# Patient Record
Sex: MALE | Race: WHITE | NOT HISPANIC OR LATINO | Employment: FULL TIME | URBAN - NONMETROPOLITAN AREA
[De-identification: names, ages, dates, MRNs, and addresses within clinical notes are randomized per-mention and may not be internally consistent; named-entity substitution may affect disease eponyms.]

---

## 2023-10-10 ENCOUNTER — OFFICE VISIT (OUTPATIENT)
Dept: PSYCHIATRY | Facility: CLINIC | Age: 23
End: 2023-10-10
Payer: COMMERCIAL

## 2023-10-10 DIAGNOSIS — Z62.819 HISTORY OF ABUSE IN CHILDHOOD: ICD-10-CM

## 2023-10-10 DIAGNOSIS — F31.81 BIPOLAR II DISORDER: Primary | ICD-10-CM

## 2023-10-10 DIAGNOSIS — G47.9 TROUBLE IN SLEEPING: ICD-10-CM

## 2023-10-10 PROCEDURE — 90837 PSYTX W PT 60 MINUTES: CPT | Performed by: SOCIAL WORKER
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
## 2023-10-10 NOTE — TREATMENT PLAN
Multi-Disciplinary Problems (from Behavioral Health Treatment Plan)      Active Problems       Problem: Mood Instability  Start Date: 10/10/23      Problem Details: The patient self-scales this problem as a 7 with 10 being the worst.          Goal Priority Start Date Expected End Date End Date    Patient will achieve mood stability as evidenced by controlled behavior and a more deliberate thought process -- 10/10/23 -- --    Goal Details: Progress toward goal:  The patient self-scales their progress related to this goal as a 7 with 10 being the worst.          Goal Intervention Frequency Start Date End Date    Provide structure and focus to patient's thoughts and actions by establishing plans and routine. PRN 10/10/23 --    Intervention Details: Duration of treatment until until discharged.          Goal Intervention Frequency Start Date End Date    Assist patient in setting responsible goals and limits in behavior. PRN 10/10/23 --    Intervention Details: Duration of treatment until until discharged.                          Reviewed By       Martina Boogie LCSW 10/10/23 1300                     I have discussed and reviewed this treatment plan with the patient.

## 2023-10-24 ENCOUNTER — OFFICE VISIT (OUTPATIENT)
Dept: PSYCHIATRY | Facility: CLINIC | Age: 23
End: 2023-10-24
Payer: COMMERCIAL

## 2023-10-24 DIAGNOSIS — G47.9 TROUBLE IN SLEEPING: ICD-10-CM

## 2023-10-24 DIAGNOSIS — Z62.819 HISTORY OF ABUSE IN CHILDHOOD: ICD-10-CM

## 2023-10-24 DIAGNOSIS — F31.81 BIPOLAR II DISORDER: Primary | ICD-10-CM

## 2023-10-24 PROCEDURE — 90837 PSYTX W PT 60 MINUTES: CPT | Performed by: SOCIAL WORKER
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
## 2023-10-31 ENCOUNTER — TELEPHONE (OUTPATIENT)
Dept: PSYCHIATRY | Facility: CLINIC | Age: 23
End: 2023-10-31
Payer: COMMERCIAL

## 2023-10-31 NOTE — TELEPHONE ENCOUNTER
Patient called stating that a friend tried to kill themselves last night.  Provided education, therapeutic support. Discussed safety plan, 988 hotline, what to do if he begins to have any thoughts of self harm or harm to others which he adamantly and convincingly denies.  Patient was offered an appointment to day which he declined. Martina DAVIS

## 2023-11-13 ENCOUNTER — OFFICE VISIT (OUTPATIENT)
Dept: PSYCHIATRY | Facility: CLINIC | Age: 23
End: 2023-11-13
Payer: COMMERCIAL

## 2023-11-13 DIAGNOSIS — F31.81 BIPOLAR II DISORDER: Primary | ICD-10-CM

## 2023-11-13 PROCEDURE — 90837 PSYTX W PT 60 MINUTES: CPT | Performed by: SOCIAL WORKER
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
## 2023-12-01 DIAGNOSIS — G47.9 TROUBLE IN SLEEPING: ICD-10-CM

## 2023-12-01 DIAGNOSIS — F31.81 BIPOLAR II DISORDER: ICD-10-CM

## 2023-12-04 RX ORDER — LAMOTRIGINE 200 MG/1
200 TABLET ORAL DAILY
Qty: 30 TABLET | Refills: 1 | Status: SHIPPED | OUTPATIENT
Start: 2023-12-04

## 2023-12-04 RX ORDER — HYDROXYZINE HYDROCHLORIDE 25 MG/1
TABLET, FILM COATED ORAL
Qty: 60 TABLET | Refills: 1 | Status: SHIPPED | OUTPATIENT
Start: 2023-12-04

## 2023-12-18 ENCOUNTER — OFFICE VISIT (OUTPATIENT)
Dept: PSYCHIATRY | Facility: CLINIC | Age: 23
End: 2023-12-18
Payer: COMMERCIAL

## 2023-12-18 DIAGNOSIS — G47.9 TROUBLE IN SLEEPING: ICD-10-CM

## 2023-12-18 DIAGNOSIS — Z62.819 HISTORY OF ABUSE IN CHILDHOOD: ICD-10-CM

## 2023-12-18 DIAGNOSIS — F31.81 BIPOLAR II DISORDER: Primary | ICD-10-CM

## 2023-12-18 PROCEDURE — 90837 PSYTX W PT 60 MINUTES: CPT | Performed by: SOCIAL WORKER
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
## 2024-01-02 ENCOUNTER — OFFICE VISIT (OUTPATIENT)
Dept: PSYCHIATRY | Facility: CLINIC | Age: 24
End: 2024-01-02
Payer: COMMERCIAL

## 2024-01-02 DIAGNOSIS — Z62.819 HISTORY OF ABUSE IN CHILDHOOD: ICD-10-CM

## 2024-01-02 DIAGNOSIS — F31.81 BIPOLAR II DISORDER: Primary | ICD-10-CM

## 2024-01-02 PROCEDURE — 90837 PSYTX W PT 60 MINUTES: CPT | Performed by: SOCIAL WORKER
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
## 2024-01-03 NOTE — TREATMENT PLAN
Multi-Disciplinary Problems (from Behavioral Health Treatment Plan)      Active Problems       Problem: Mood Instability  Start Date: 10/10/23      Problem Details: The patient self-scales this problem as a 7 with 10 being the worst.          Goal Priority Start Date Expected End Date End Date    Patient will achieve mood stability as evidenced by controlled behavior and a more deliberate thought process -- 10/10/23 -- --    Goal Details: Progress toward goal:  The patient self-scales their progress related to this goal as a 4-5 with 10 being the worst.          Goal Intervention Frequency Start Date End Date    Provide structure and focus to patient's thoughts and actions by establishing plans and routine. PRN 10/10/23 --    Intervention Details: Duration of treatment until until discharged.          Goal Intervention Frequency Start Date End Date    Assist patient in setting responsible goals and limits in behavior. PRN 10/10/23 --    Intervention Details: Duration of treatment until until discharged.                          Reviewed By       Martina Boogie LCSW 10/10/23 1301                Patient continues to work towards improving his life and recently bought a house with his . 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 He has continued to identify the problem and work towards solutions and acknowledges the changes he has made.  He has begun to acknowledge and make changes to his life, responding versus reacting since first being seen.  Has been able to identify some of the triggers that worsen his mood.     I have discussed and reviewed this treatment plan with the patient.

## 2024-01-24 ENCOUNTER — TELEMEDICINE (OUTPATIENT)
Dept: PSYCHIATRY | Facility: CLINIC | Age: 24
End: 2024-01-24
Payer: COMMERCIAL

## 2024-01-24 DIAGNOSIS — G47.9 TROUBLE IN SLEEPING: ICD-10-CM

## 2024-01-24 DIAGNOSIS — Z62.819 HISTORY OF ABUSE IN CHILDHOOD: ICD-10-CM

## 2024-01-24 DIAGNOSIS — F31.81 BIPOLAR II DISORDER: Primary | ICD-10-CM

## 2024-01-24 PROCEDURE — 99214 OFFICE O/P EST MOD 30 MIN: CPT | Performed by: NURSE PRACTITIONER

## 2024-01-24 RX ORDER — HYDROXYZINE HYDROCHLORIDE 25 MG/1
TABLET, FILM COATED ORAL
Qty: 60 TABLET | Refills: 2 | Status: SHIPPED | OUTPATIENT
Start: 2024-01-24

## 2024-01-24 RX ORDER — LAMOTRIGINE 200 MG/1
200 TABLET ORAL DAILY
Qty: 30 TABLET | Refills: 2 | Status: SHIPPED | OUTPATIENT
Start: 2024-01-24
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
## 2024-02-12 ENCOUNTER — OFFICE VISIT (OUTPATIENT)
Dept: PSYCHIATRY | Facility: CLINIC | Age: 24
End: 2024-02-12
Payer: COMMERCIAL

## 2024-02-12 DIAGNOSIS — F31.81 BIPOLAR II DISORDER: Primary | ICD-10-CM

## 2024-02-12 DIAGNOSIS — Z62.819 HISTORY OF ABUSE IN CHILDHOOD: ICD-10-CM

## 2024-02-12 DIAGNOSIS — G47.9 TROUBLE IN SLEEPING: ICD-10-CM

## 2024-02-12 PROCEDURE — 90837 PSYTX W PT 60 MINUTES: CPT | Performed by: SOCIAL WORKER

## 2024-02-26 ENCOUNTER — OFFICE VISIT (OUTPATIENT)
Dept: PSYCHIATRY | Facility: CLINIC | Age: 24
End: 2024-02-26
Payer: COMMERCIAL

## 2024-02-26 DIAGNOSIS — Z62.819 HISTORY OF ABUSE IN CHILDHOOD: ICD-10-CM

## 2024-02-26 DIAGNOSIS — F31.81 BIPOLAR II DISORDER: Primary | ICD-10-CM

## 2024-02-26 DIAGNOSIS — G47.9 TROUBLE IN SLEEPING: ICD-10-CM

## 2024-02-26 PROCEDURE — 90837 PSYTX W PT 60 MINUTES: CPT | Performed by: SOCIAL WORKER
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
## 2024-03-18 ENCOUNTER — OFFICE VISIT (OUTPATIENT)
Dept: PSYCHIATRY | Facility: CLINIC | Age: 24
End: 2024-03-18
Payer: COMMERCIAL

## 2024-03-18 DIAGNOSIS — F31.81 BIPOLAR II DISORDER: Primary | ICD-10-CM

## 2024-03-18 DIAGNOSIS — Z62.819 HISTORY OF ABUSE IN CHILDHOOD: ICD-10-CM

## 2024-03-18 PROCEDURE — 90837 PSYTX W PT 60 MINUTES: CPT | Performed by: SOCIAL WORKER
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
## 2024-04-10 ENCOUNTER — OFFICE VISIT (OUTPATIENT)
Dept: FAMILY MEDICINE CLINIC | Facility: CLINIC | Age: 24
End: 2024-04-10
Payer: COMMERCIAL

## 2024-04-10 VITALS
OXYGEN SATURATION: 97 % | TEMPERATURE: 98.4 F | HEIGHT: 72 IN | WEIGHT: 172.6 LBS | BODY MASS INDEX: 23.38 KG/M2 | HEART RATE: 85 BPM | DIASTOLIC BLOOD PRESSURE: 80 MMHG | SYSTOLIC BLOOD PRESSURE: 122 MMHG

## 2024-04-10 DIAGNOSIS — K60.2 ANAL FISSURE: Primary | ICD-10-CM

## 2024-04-10 RX ORDER — HYDROCORTISONE ACETATE 25 MG/1
25 SUPPOSITORY RECTAL 2 TIMES DAILY
Qty: 24 EACH | Refills: 0 | Status: SHIPPED | OUTPATIENT
Start: 2024-04-10

## 2024-04-10 NOTE — PROGRESS NOTES
"Chief Complaint  anal fissue    Subjective          Jens Rogel presents to [facility]  Rectal Bleeding  This is a new problem. The current episode started more than 1 month ago. The problem has been unchanged. Associated symptoms comments: States he has been having rectal bleeding when he is having intercourse. States it is painful when he has bowel movements however he has not bleeding. .       Review of Systems   Gastrointestinal:  Positive for hematochezia.         Objective   Vital Signs:   /80 (BP Location: Right arm, Patient Position: Sitting, Cuff Size: Adult)   Pulse 85   Temp 98.4 °F (36.9 °C) (Temporal)   Ht 182.9 cm (72.01\")   Wt 78.3 kg (172 lb 9.6 oz)   SpO2 97%   BMI 23.40 kg/m²     Physical Exam  Constitutional:       General: Jens is not in acute distress.     Appearance: Normal appearance. Latedmund is well-developed and well-groomed. Lattie is not ill-appearing, toxic-appearing or diaphoretic.   HENT:      Head: Normocephalic.      Nose: Nose normal. No congestion or rhinorrhea.      Mouth/Throat:      Mouth: Mucous membranes are moist.      Pharynx: Oropharynx is clear. No oropharyngeal exudate or posterior oropharyngeal erythema.   Eyes:      General: Lids are normal.         Right eye: No discharge.         Left eye: No discharge.      Extraocular Movements: Extraocular movements intact.      Pupils: Pupils are equal, round, and reactive to light.   Neck:      Vascular: No carotid bruit.   Cardiovascular:      Rate and Rhythm: Normal rate and regular rhythm.      Pulses: Normal pulses.      Heart sounds: Normal heart sounds. No murmur heard.     No friction rub. No gallop.   Pulmonary:      Effort: Pulmonary effort is normal. No respiratory distress.      Breath sounds: Normal breath sounds. No stridor. No wheezing, rhonchi or rales.   Chest:      Chest wall: No tenderness.   Abdominal:      General: Bowel sounds are normal. There is no " distension.      Palpations: Abdomen is soft. There is no mass.      Tenderness: There is no abdominal tenderness. There is no right CVA tenderness, left CVA tenderness, guarding or rebound.      Hernia: No hernia is present.   Musculoskeletal:         General: No swelling or tenderness. Normal range of motion.      Cervical back: Normal range of motion and neck supple. No rigidity or tenderness.      Right lower leg: No edema.      Left lower leg: No edema.   Lymphadenopathy:      Cervical: No cervical adenopathy.   Skin:     General: Skin is warm.      Capillary Refill: Capillary refill takes less than 2 seconds.      Coloration: Skin is not jaundiced.      Findings: No bruising, erythema or rash.   Neurological:      General: No focal deficit present.      Mental Status: Jens is alert and oriented to person, place, and time.      Motor: Motor function is intact. No weakness.      Coordination: Coordination is intact.      Gait: Gait is intact. Gait normal.   Psychiatric:         Attention and Perception: Attention normal.         Mood and Affect: Mood normal.         Speech: Speech normal.         Behavior: Behavior normal.         Cognition and Memory: Cognition normal.         Judgment: Judgment normal.        Result Review :                 Assessment and Plan    Diagnoses and all orders for this visit:    1. Anal fissure (Primary)  -     hydrocortisone (ANUSOL-HC) 25 MG suppository; Insert 1 suppository into the rectum 2 (Two) Times a Day.  Dispense: 24 each; Refill: 0  -     Ambulatory Referral to General Surgery      Patient's Body mass index is 23.4 kg/m². indicating that Latedmund is within normal range (BMI 18.5-24.9). No BMI management plan needed..    Follow Up   Return in about 2 weeks (around 4/24/2024).  Patient was given instructions and counseling regarding Latedmund's condition or for health maintenance advice. Please see specific information pulled into the AVS if appropriate.     This document has  been electronically signed by ROBYN Lopez  April 10, 2024 13:21 EDT

## 2024-04-11 ENCOUNTER — PATIENT ROUNDING (BHMG ONLY) (OUTPATIENT)
Dept: FAMILY MEDICINE CLINIC | Facility: CLINIC | Age: 24
End: 2024-04-11
Payer: COMMERCIAL

## 2024-04-11 NOTE — PROGRESS NOTES
April 11, 2024    Hello, may I speak with Jens Rogel?    My name is   Yvrose     I am  with [facility] [facility]  [facility]  [address] [place] [address] [phone].    Before we get started may I verify your date of birth? 2000 yes      I am calling to officially welcome you to our practice and ask about your recent visit. Is this a good time to talk? Yes     Tell me about your visit with us. What things went well?  enjoyed the visit,and the office as well       We're always looking for ways to make our patients' experiences even better. Do you have recommendations on ways we may improve?  no     Overall were you satisfied with your first visit to our practice?  Yes        I appreciate you taking the time to speak with me today. Is there anything else I can do for you?   No     Thank you, and have a great day.

## 2024-04-15 ENCOUNTER — OFFICE VISIT (OUTPATIENT)
Dept: PSYCHIATRY | Facility: CLINIC | Age: 24
End: 2024-04-15
Payer: COMMERCIAL

## 2024-04-15 VITALS
WEIGHT: 173 LBS | TEMPERATURE: 98.4 F | HEIGHT: 72 IN | BODY MASS INDEX: 23.43 KG/M2 | SYSTOLIC BLOOD PRESSURE: 124 MMHG | HEART RATE: 84 BPM | DIASTOLIC BLOOD PRESSURE: 76 MMHG | OXYGEN SATURATION: 98 %

## 2024-04-15 DIAGNOSIS — F31.81 BIPOLAR II DISORDER: Primary | ICD-10-CM

## 2024-04-15 DIAGNOSIS — Z62.819 HISTORY OF ABUSE IN CHILDHOOD: ICD-10-CM

## 2024-04-15 DIAGNOSIS — G47.9 TROUBLE IN SLEEPING: ICD-10-CM

## 2024-04-15 PROCEDURE — 99214 OFFICE O/P EST MOD 30 MIN: CPT | Performed by: NURSE PRACTITIONER

## 2024-04-15 RX ORDER — HYDROXYZINE HYDROCHLORIDE 25 MG/1
TABLET, FILM COATED ORAL
Qty: 60 TABLET | Refills: 2 | Status: SHIPPED | OUTPATIENT
Start: 2024-04-15

## 2024-04-15 RX ORDER — LAMOTRIGINE 200 MG/1
200 TABLET ORAL DAILY
Qty: 30 TABLET | Refills: 2 | Status: SHIPPED | OUTPATIENT
Start: 2024-04-15
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
## 2024-04-19 ENCOUNTER — OFFICE VISIT (OUTPATIENT)
Dept: FAMILY MEDICINE CLINIC | Facility: CLINIC | Age: 24
End: 2024-04-19
Payer: COMMERCIAL

## 2024-04-19 ENCOUNTER — TELEPHONE (OUTPATIENT)
Dept: FAMILY MEDICINE CLINIC | Facility: CLINIC | Age: 24
End: 2024-04-19

## 2024-04-19 ENCOUNTER — LAB (OUTPATIENT)
Dept: FAMILY MEDICINE CLINIC | Facility: CLINIC | Age: 24
End: 2024-04-19
Payer: COMMERCIAL

## 2024-04-19 ENCOUNTER — HOSPITAL ENCOUNTER (OUTPATIENT)
Dept: CT IMAGING | Facility: HOSPITAL | Age: 24
Discharge: HOME | End: 2024-04-19
Admitting: STUDENT IN AN ORGANIZED HEALTH CARE EDUCATION/TRAINING PROGRAM
Payer: COMMERCIAL

## 2024-04-19 ENCOUNTER — TELEPHONE (OUTPATIENT)
Dept: GENERAL RADIOLOGY | Facility: HOSPITAL | Age: 24
End: 2024-04-19
Payer: COMMERCIAL

## 2024-04-19 VITALS
HEIGHT: 72 IN | TEMPERATURE: 98.4 F | WEIGHT: 171.6 LBS | HEART RATE: 94 BPM | OXYGEN SATURATION: 96 % | BODY MASS INDEX: 23.24 KG/M2 | SYSTOLIC BLOOD PRESSURE: 138 MMHG | DIASTOLIC BLOOD PRESSURE: 70 MMHG

## 2024-04-19 DIAGNOSIS — R10.9 ACUTE ABDOMINAL PAIN: ICD-10-CM

## 2024-04-19 DIAGNOSIS — R10.9 ACUTE ABDOMINAL PAIN: Primary | ICD-10-CM

## 2024-04-19 PROCEDURE — 74176 CT ABD & PELVIS W/O CONTRAST: CPT

## 2024-04-19 PROCEDURE — 85027 COMPLETE CBC AUTOMATED: CPT | Performed by: STUDENT IN AN ORGANIZED HEALTH CARE EDUCATION/TRAINING PROGRAM

## 2024-04-19 PROCEDURE — 99214 OFFICE O/P EST MOD 30 MIN: CPT | Performed by: STUDENT IN AN ORGANIZED HEALTH CARE EDUCATION/TRAINING PROGRAM

## 2024-04-19 PROCEDURE — 36415 COLL VENOUS BLD VENIPUNCTURE: CPT | Performed by: STUDENT IN AN ORGANIZED HEALTH CARE EDUCATION/TRAINING PROGRAM

## 2024-04-19 PROCEDURE — 86140 C-REACTIVE PROTEIN: CPT | Performed by: STUDENT IN AN ORGANIZED HEALTH CARE EDUCATION/TRAINING PROGRAM

## 2024-04-19 PROCEDURE — 80053 COMPREHEN METABOLIC PANEL: CPT | Performed by: STUDENT IN AN ORGANIZED HEALTH CARE EDUCATION/TRAINING PROGRAM

## 2024-04-19 RX ORDER — ONDANSETRON 4 MG/1
4 TABLET, ORALLY DISINTEGRATING ORAL EVERY 8 HOURS PRN
Qty: 30 TABLET | Refills: 0 | Status: SHIPPED | OUTPATIENT
Start: 2024-04-19

## 2024-04-19 NOTE — PROGRESS NOTES
Chief Complaint  Vomiting    HPI:   Vomiting        Jens Rogel is a 23 y.o. male who presents today to [facility] for Vomiting. Patient is a manager at [employer]. He reported he was at work this morning and developed severe abdominal pain, chills, and nausea. He felt like he was about to pass out but did not synopsize. The patient reports that he continues to feel nauseous. He reports nausea, anorexia, and diffuse abdominal pain.     Previous History:   History reviewed. No pertinent past medical history.   Past Surgical History:   Procedure Laterality Date    TONSILLECTOMY AND ADENOIDECTOMY        Social History     Socioeconomic History    Marital status: Significant Other   Tobacco Use    Smoking status: Never    Smokeless tobacco: Never   Vaping Use    Vaping status: Every Day    Substances: Nicotine   Substance and Sexual Activity    Alcohol use: Never    Drug use: Never    Sexual activity: Defer      Health Maintenance Due   Topic Date Due    TDAP/TD VACCINES (1 - Tdap) Never done    HEPATITIS C SCREENING  Never done    ANNUAL PHYSICAL  Never done    COVID-19 Vaccine (3 - 2023-24 season) 09/01/2023        Current Medications:  Current Outpatient Medications   Medication Sig Dispense Refill    hydrocortisone (ANUSOL-HC) 25 MG suppository Insert 1 suppository into the rectum 2 (Two) Times a Day. 24 each 0    hydrOXYzine (ATARAX) 25 MG tablet TAKE ONE (1) TO TWO (2) TABLETS BY MOUTH EVERY NIGHT AT BEDTIME 60 tablet 2    lamoTRIgine (LaMICtal) 200 MG tablet Take 1 tablet by mouth Daily. 30 tablet 2    ondansetron ODT (ZOFRAN-ODT) 4 MG disintegrating tablet Place 1 tablet on the tongue Every 8 (Eight) Hours As Needed for Nausea. 30 tablet 0     No current facility-administered medications for this visit.       Allergies:   Allergies   Allergen Reactions    Penicillins Hives       Vitals:   /70 (BP Location: Right arm, Patient Position: Sitting, Cuff Size: Adult)   Pulse  "94   Temp 98.4 °F (36.9 °C)   Ht 182.9 cm (72.01\")   Wt 77.8 kg (171 lb 9.6 oz)   SpO2 96%   BMI 23.27 kg/m²     Estimated body mass index is 23.27 kg/m² as calculated from the following:    Height as of this encounter: 182.9 cm (72.01\").    Weight as of this encounter: 77.8 kg (171 lb 9.6 oz).    Jens Rogel  reports that Jens has never smoked. Jens has never used smokeless tobacco.          Physical Exam:   Physical Exam  Constitutional:       Appearance: Normal appearance.   HENT:      Mouth/Throat:      Mouth: Mucous membranes are moist.   Cardiovascular:      Rate and Rhythm: Normal rate and regular rhythm.   Pulmonary:      Effort: Pulmonary effort is normal.      Breath sounds: Normal breath sounds. No wheezing or rhonchi.   Abdominal:      General: Abdomen is flat.      Tenderness: There is generalized abdominal tenderness and tenderness in the right lower quadrant. There is guarding. There is no rebound. Positive signs include McBurney's sign.   Musculoskeletal:         General: Normal range of motion.   Skin:     General: Skin is warm and dry.   Neurological:      Mental Status: Jens is alert.   Psychiatric:         Mood and Affect: Mood normal.          Lab Results:   No visits with results within 6 Month(s) from this visit.   Latest known visit with results is:   No results found for any previous visit.       Assessment and Plan  Diagnoses and all orders for this visit:    1. Acute abdominal pain (Primary)  -     CBC No Differential; Future  -     Comprehensive metabolic panel; Future  -     C-reactive protein; Future  -     CT Abdomen Pelvis Without Contrast; Future  -     ondansetron ODT (ZOFRAN-ODT) 4 MG disintegrating tablet; Place 1 tablet on the tongue Every 8 (Eight) Hours As Needed for Nausea.  Dispense: 30 tablet; Refill: 0  -     CBC No Differential  -     Comprehensive metabolic panel  -     C-reactive protein    Patient has acute abdominal pain. Has guarding, and mcburneys " point tenderness. Stat CT and blood work up was ordered. The CT report reveals Acute appendicitis. Patient was contacted and recommended to go to the ER. Patient is en route to the ER. ER physician Dr. Goyal was notified. Patient then called back and reports that he is going to [facility], Attending physician was notified. CT scan was faxed to the hospital.     BMI is within normal parameters. No other follow-up for BMI required.       New Medications:   New Medications Ordered This Visit   Medications    ondansetron ODT (ZOFRAN-ODT) 4 MG disintegrating tablet     Sig: Place 1 tablet on the tongue Every 8 (Eight) Hours As Needed for Nausea.     Dispense:  30 tablet     Refill:  0       Discontinued Medications:   There are no discontinued medications.              Follow Up:   No follow-ups on file.    Patient was given instructions and counseling regarding Lattie's condition or for health maintenance advice. Please see specific information pulled into the AVS if appropriate.       This document has been electronically signed by Lionel Valentin DO   April 19, 2024 16:02 EDT    Dictated Utilizing Dragon Dictation: Part of this note may be an electronic transcription/translation of spoken language to printed text using the Dragon Dictation System.

## 2024-04-20 LAB
ALBUMIN SERPL-MCNC: 4.9 G/DL (ref 3.5–5.2)
ALBUMIN/GLOB SERPL: 2.5 G/DL
ALP SERPL-CCNC: 93 U/L (ref 39–117)
ALT SERPL W P-5'-P-CCNC: 21 U/L (ref 1–41)
ANION GAP SERPL CALCULATED.3IONS-SCNC: 13.4 MMOL/L (ref 5–15)
AST SERPL-CCNC: 47 U/L (ref 1–40)
BILIRUB SERPL-MCNC: 0.6 MG/DL (ref 0–1.2)
BUN SERPL-MCNC: 16 MG/DL (ref 6–20)
BUN/CREAT SERPL: 14.5 (ref 7–25)
CALCIUM SPEC-SCNC: 9.9 MG/DL (ref 8.6–10.5)
CHLORIDE SERPL-SCNC: 103 MMOL/L (ref 98–107)
CO2 SERPL-SCNC: 25.6 MMOL/L (ref 22–29)
CREAT SERPL-MCNC: 1.1 MG/DL (ref 0.76–1.27)
CRP SERPL-MCNC: <0.3 MG/DL (ref 0–0.5)
DEPRECATED RDW RBC AUTO: 40.4 FL (ref 37–54)
EGFRCR SERPLBLD CKD-EPI 2021: 96.7 ML/MIN/1.73
ERYTHROCYTE [DISTWIDTH] IN BLOOD BY AUTOMATED COUNT: 12.3 % (ref 12.3–15.4)
GLOBULIN UR ELPH-MCNC: 2 GM/DL
GLUCOSE SERPL-MCNC: 77 MG/DL (ref 65–99)
HCT VFR BLD AUTO: 47.1 % (ref 37.5–51)
HGB BLD-MCNC: 15.7 G/DL (ref 13–17.7)
MCH RBC QN AUTO: 29.9 PG (ref 26.6–33)
MCHC RBC AUTO-ENTMCNC: 33.3 G/DL (ref 31.5–35.7)
MCV RBC AUTO: 89.7 FL (ref 79–97)
PLATELET # BLD AUTO: 199 10*3/MM3 (ref 140–450)
PMV BLD AUTO: 11.7 FL (ref 6–12)
POTASSIUM SERPL-SCNC: 4 MMOL/L (ref 3.5–5.2)
PROT SERPL-MCNC: 6.9 G/DL (ref 6–8.5)
RBC # BLD AUTO: 5.25 10*6/MM3 (ref 4.14–5.8)
SODIUM SERPL-SCNC: 142 MMOL/L (ref 136–145)
WBC NRBC COR # BLD AUTO: 10.83 10*3/MM3 (ref 3.4–10.8)

## 2024-04-22 ENCOUNTER — TELEPHONE (OUTPATIENT)
Dept: FAMILY MEDICINE CLINIC | Facility: CLINIC | Age: 24
End: 2024-04-22
Payer: COMMERCIAL

## 2024-04-22 NOTE — TELEPHONE ENCOUNTER
----- Message from Lionel Valentin, DO sent at 4/22/2024  8:50 AM EDT -----  Please try to contact the patient, I want to make sure he went to the hospital. CT showed acute appendicitis on Friday, He was supposed to go directly to [facility]. I do not see any records and I am unable to get a hold of him.

## 2024-04-22 NOTE — TELEPHONE ENCOUNTER
Left a message on his & spouses voice mails to return call.    Requested records from [facility] so we will see.

## 2024-04-22 NOTE — TELEPHONE ENCOUNTER
PATIENT STATES THEIR PAIN IS CONSISTENT AND OUR PATIENT HAS NOT HAD A BOWEL MOVEMENT SINCE FRIDAY MORNING.

## 2024-04-22 NOTE — TELEPHONE ENCOUNTER
Wife returned call he did go to ER,had an appendectomy on Saturday morning & came home Saturday evening,having pain but better each day,getting up on his own now,doing ok.

## 2024-04-23 NOTE — TELEPHONE ENCOUNTER
This is typical post operatively. Recommend OTC miralax 17g once daily, and ibuprofen/tylenol. He follows with [facility] tomorrow.

## 2024-04-24 ENCOUNTER — OFFICE VISIT (OUTPATIENT)
Dept: FAMILY MEDICINE CLINIC | Facility: CLINIC | Age: 24
End: 2024-04-24
Payer: COMMERCIAL

## 2024-04-24 VITALS
BODY MASS INDEX: 23.68 KG/M2 | OXYGEN SATURATION: 95 % | WEIGHT: 174.8 LBS | HEIGHT: 72 IN | DIASTOLIC BLOOD PRESSURE: 80 MMHG | HEART RATE: 77 BPM | SYSTOLIC BLOOD PRESSURE: 124 MMHG | TEMPERATURE: 98.6 F

## 2024-04-24 DIAGNOSIS — Z90.49 S/P APPENDECTOMY: Primary | ICD-10-CM

## 2024-04-24 NOTE — PROGRESS NOTES
Transitional Care Follow Up Visit  Subjective     Jens FER Rogel is a 24 y.o. male who presents for a transitional care management visit.    Within 48 business hours after discharge our office contacted Jens via telephone to coordinate Jens's care and needs.      I reviewed and discussed the details of that call along with the discharge summary, hospital problems, inpatient lab results, inpatient diagnostic studies, and consultation reports with Jens.     Current outpatient and discharge medications have been reconciled for the patient.  Reviewed by: ROBYN Lopez           No data to display              Risk for Readmission (LACE) No data recorded    History of Present Illness   Course During Hospital Stay:  hospital records reviewed and discharge summary copied below  Jens Rogel is a 22 y/o male who presents to ED with c/o periumbilical abdominal pain since this mrebony. Has had chills. He is nauseous, no vomiting. He went to see his PCP, Dr. Valentin who ordered an outpatient CT scan which revealed acute appendicitis. He was informed to come to ED.     Patient states he is doing well. Denies any redness or drainage from surgical site.      The following portions of the patient's history were reviewed and updated as appropriate: allergies, current medications, past family history, past medical history, past social history, past surgical history, and problem list.    Review of Systems    Objective   Physical Exam  Constitutional:       General: Jens is not in acute distress.     Appearance: Normal appearance. Latedmund is well-developed and well-groomed. Latedmund is not ill-appearing, toxic-appearing or diaphoretic.   HENT:      Head: Normocephalic.      Nose: Nose normal. No congestion or rhinorrhea.      Mouth/Throat:      Mouth: Mucous membranes are moist.      Pharynx: Oropharynx is clear. No oropharyngeal exudate or posterior oropharyngeal erythema.   Eyes:      General: Lids are normal.          Right eye: No discharge.         Left eye: No discharge.      Extraocular Movements: Extraocular movements intact.      Pupils: Pupils are equal, round, and reactive to light.   Neck:      Vascular: No carotid bruit.   Cardiovascular:      Rate and Rhythm: Normal rate and regular rhythm.      Pulses: Normal pulses.      Heart sounds: Normal heart sounds. No murmur heard.     No friction rub. No gallop.   Pulmonary:      Effort: Pulmonary effort is normal. No respiratory distress.      Breath sounds: Normal breath sounds. No stridor. No wheezing, rhonchi or rales.   Chest:      Chest wall: No tenderness.   Abdominal:      General: Bowel sounds are normal. There is no distension.      Palpations: Abdomen is soft. There is no mass.      Tenderness: There is no abdominal tenderness. There is no right CVA tenderness, left CVA tenderness, guarding or rebound.      Hernia: No hernia is present.   Musculoskeletal:         General: No swelling or tenderness. Normal range of motion.      Cervical back: Normal range of motion and neck supple. No rigidity or tenderness.      Right lower leg: No edema.      Left lower leg: No edema.   Lymphadenopathy:      Cervical: No cervical adenopathy.   Skin:     General: Skin is warm.      Capillary Refill: Capillary refill takes less than 2 seconds.      Coloration: Skin is not jaundiced.      Findings: No bruising, erythema or rash.   Neurological:      General: No focal deficit present.      Mental Status: Lattie is alert and oriented to person, place, and time.      Motor: Motor function is intact. No weakness.      Coordination: Coordination is intact.      Gait: Gait is intact. Gait normal.   Psychiatric:         Attention and Perception: Attention normal.         Mood and Affect: Mood normal.         Speech: Speech normal.         Behavior: Behavior normal.         Cognition and Memory: Cognition normal.         Judgment: Judgment normal.         Assessment & Plan   Diagnoses and  all orders for this visit:    1. S/P appendectomy (Primary)  Comments:  continue nsaids for pain  go to ED if develop fever or distention               Procedures     Return if symptoms worsen or fail to improve.     This document has been electronically signed by ROBYN Lopez  April 24, 2024 16:28 EDT

## 2024-04-30 ENCOUNTER — TELEPHONE (OUTPATIENT)
Dept: FAMILY MEDICINE CLINIC | Facility: CLINIC | Age: 24
End: 2024-04-30
Payer: COMMERCIAL

## 2024-04-30 NOTE — TELEPHONE ENCOUNTER
Caller: Jens Rogel    Relationship: Self    Best call back number: [phone]     What test/procedure requested: CT SCAN AB/PELVIS W/OUT CONTRAST.  DOS [id number]    When is it needed: ASAP     Additional information or concerns: INSURANCE APPROVED SERVICE EFFECTIVE [id number] BUT NEED THIS AUTH MADE RETRO TO [id number]. CALL [phone]  REF #[id number]

## 2024-05-06 ENCOUNTER — TELEMEDICINE (OUTPATIENT)
Dept: PSYCHIATRY | Facility: CLINIC | Age: 24
End: 2024-05-06
Payer: COMMERCIAL

## 2024-05-06 ENCOUNTER — PATIENT MESSAGE (OUTPATIENT)
Dept: PSYCHIATRY | Facility: CLINIC | Age: 24
End: 2024-05-06

## 2024-05-06 DIAGNOSIS — F31.81 BIPOLAR II DISORDER: Primary | ICD-10-CM

## 2024-05-06 DIAGNOSIS — Z62.819 HISTORY OF ABUSE IN CHILDHOOD: ICD-10-CM

## 2024-05-06 DIAGNOSIS — G47.9 TROUBLE IN SLEEPING: ICD-10-CM

## 2024-05-06 PROCEDURE — 90837 PSYTX W PT 60 MINUTES: CPT | Performed by: SOCIAL WORKER

## 2024-06-11 ENCOUNTER — TELEMEDICINE (OUTPATIENT)
Dept: PSYCHIATRY | Facility: CLINIC | Age: 24
End: 2024-06-11
Payer: COMMERCIAL

## 2024-06-11 DIAGNOSIS — Z62.819 HISTORY OF ABUSE IN CHILDHOOD: ICD-10-CM

## 2024-06-11 DIAGNOSIS — F31.81 BIPOLAR II DISORDER: Primary | ICD-10-CM

## 2024-06-11 DIAGNOSIS — G47.9 TROUBLE IN SLEEPING: ICD-10-CM

## 2024-06-11 PROCEDURE — 90837 PSYTX W PT 60 MINUTES: CPT | Performed by: SOCIAL WORKER
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
## 2024-06-13 ENCOUNTER — TELEPHONE (OUTPATIENT)
Dept: FAMILY MEDICINE CLINIC | Facility: CLINIC | Age: 24
End: 2024-06-13
Payer: COMMERCIAL

## 2024-06-13 NOTE — TELEPHONE ENCOUNTER
Pt called stated they would like you to give them a call has some disability papers that are going to need to be filled out wants to let you know what they need to say

## 2024-06-18 ENCOUNTER — TELEPHONE (OUTPATIENT)
Dept: FAMILY MEDICINE CLINIC | Facility: CLINIC | Age: 24
End: 2024-06-18
Payer: COMMERCIAL

## 2024-06-24 ENCOUNTER — TELEMEDICINE (OUTPATIENT)
Dept: PSYCHIATRY | Facility: CLINIC | Age: 24
End: 2024-06-24
Payer: COMMERCIAL

## 2024-06-24 DIAGNOSIS — Z62.819 HISTORY OF ABUSE IN CHILDHOOD: ICD-10-CM

## 2024-06-24 DIAGNOSIS — F31.81 BIPOLAR II DISORDER: Primary | ICD-10-CM

## 2024-06-24 DIAGNOSIS — G47.9 TROUBLE IN SLEEPING: ICD-10-CM

## 2024-06-24 PROCEDURE — 90837 PSYTX W PT 60 MINUTES: CPT | Performed by: SOCIAL WORKER
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
## 2024-07-08 ENCOUNTER — TELEMEDICINE (OUTPATIENT)
Dept: PSYCHIATRY | Facility: CLINIC | Age: 24
End: 2024-07-08
Payer: COMMERCIAL

## 2024-07-08 DIAGNOSIS — F31.81 BIPOLAR II DISORDER: Primary | ICD-10-CM

## 2024-07-08 DIAGNOSIS — G47.9 TROUBLE IN SLEEPING: ICD-10-CM

## 2024-07-08 DIAGNOSIS — Z62.819 HISTORY OF ABUSE IN CHILDHOOD: ICD-10-CM

## 2024-07-08 PROCEDURE — 90837 PSYTX W PT 60 MINUTES: CPT | Performed by: SOCIAL WORKER
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
## 2024-07-15 ENCOUNTER — OFFICE VISIT (OUTPATIENT)
Dept: PSYCHIATRY | Facility: CLINIC | Age: 24
End: 2024-07-15
Payer: COMMERCIAL

## 2024-07-15 VITALS
BODY MASS INDEX: 23.68 KG/M2 | HEART RATE: 85 BPM | HEIGHT: 72 IN | WEIGHT: 174.8 LBS | SYSTOLIC BLOOD PRESSURE: 122 MMHG | DIASTOLIC BLOOD PRESSURE: 77 MMHG | OXYGEN SATURATION: 99 %

## 2024-07-15 DIAGNOSIS — G47.9 TROUBLE IN SLEEPING: ICD-10-CM

## 2024-07-15 DIAGNOSIS — F31.81 BIPOLAR II DISORDER: Primary | ICD-10-CM

## 2024-07-15 PROCEDURE — 99214 OFFICE O/P EST MOD 30 MIN: CPT | Performed by: NURSE PRACTITIONER

## 2024-07-15 RX ORDER — LAMOTRIGINE 200 MG/1
200 TABLET ORAL DAILY
Qty: 30 TABLET | Refills: 1 | Status: SHIPPED | OUTPATIENT
Start: 2024-07-15

## 2024-07-15 RX ORDER — ARIPIPRAZOLE 2 MG/1
2 TABLET ORAL DAILY
Qty: 30 TABLET | Refills: 1 | Status: SHIPPED | OUTPATIENT
Start: 2024-07-15

## 2024-07-15 NOTE — PROGRESS NOTES
Subjective   Jens Rogel is a 24 y.o. male is here today for medication management follow-up.    Chief Complaint:  Recheck on mood and sleep    HPI:    Patient states that he feels like his Lamictal is not helping as well as it used to.  He has some irritability.  He denies full on ilan symptoms.  Having trouble both falling and staying asleep.  He is at least getting 6 hours at night but states sometimes it takes him 2 hours to fall asleep.  He has failed clonidine.  The hydroxyzine at 50 mg was not helping.  He does have some depressive days.  He denies any suicidal thoughts, homicidal thoughts, or any AV hallucinations.  Denies any full on ilan just some depressive days.  He is also having some issues at work which leads into this depressive feeling and it is more situational.Body mass index is 23.7 kg/m².  No changes in appetite.  He has had an appendectomy since I last saw him and has recovered from that.  He has started back with nicotine and using a vague but he wants to try to quit on his own again using the nicotine patches over-the-counter and if that does not work has asked if I would prescribe him some nicotine patches.    The following portions of the patient's history were reviewed and updated as appropriate: allergies, current medications, past family history, past medical history, past social history, past surgical history and problem list.    Review of Systems   Constitutional:  Negative for activity change, appetite change and fatigue.   HENT: Negative.     Eyes:  Negative for visual disturbance.   Respiratory: Negative.     Cardiovascular: Negative.    Gastrointestinal:  Negative for nausea.   Endocrine: Negative.    Genitourinary: Negative.    Musculoskeletal:  Negative for arthralgias.   Skin: Negative.    Allergic/Immunologic: Negative.    Neurological:  Negative for dizziness, seizures and headaches.   Hematological: Negative.    Psychiatric/Behavioral:  Positive for sleep  "disturbance. Negative for agitation, behavioral problems, confusion, decreased concentration, dysphoric mood, hallucinations, self-injury and suicidal ideas. The patient is nervous/anxious. The patient is not hyperactive.      Reviewed copied data and there are no changes      Objective   Physical Exam  Vitals reviewed.   Constitutional:       Appearance: Normal appearance.   Musculoskeletal:      Cervical back: Normal range of motion and neck supple.   Neurological:      General: No focal deficit present.      Mental Status: Jens is alert and oriented to person, place, and time.   Psychiatric:         Attention and Perception: Attention normal.         Mood and Affect: Mood normal.         Speech: Speech normal.         Behavior: Behavior normal. Behavior is cooperative.         Thought Content: Thought content normal.         Cognition and Memory: Cognition normal.         Judgment: Judgment normal.       Blood pressure 122/77, pulse 85, height 182.9 cm (72.01\"), weight 79.3 kg (174 lb 12.8 oz), SpO2 99%.    Medication List:   Current Outpatient Medications   Medication Sig Dispense Refill    lamoTRIgine (LaMICtal) 200 MG tablet Take 1 tablet by mouth Daily. 30 tablet 1    ARIPiprazole (Abilify) 2 MG tablet Take 1 tablet by mouth Daily. 30 tablet 1    hydrocortisone (ANUSOL-HC) 25 MG suppository Insert 1 suppository into the rectum 2 (Two) Times a Day. 24 each 0     No current facility-administered medications for this visit.     Reviewed copied data and there are no changes    Mental Status Exam:   Hygiene:   good  Cooperation:  Cooperative  Eye Contact:  Good  Psychomotor Behavior:  Appropriate  Affect:  Full range  Hopelessness: Denies  Speech:  Normal  Thought Process:  Goal directed  Thought Content:  Normal  Suicidal:  None  Homicidal:  None  Hallucinations:  None  Delusion:  None  Memory:  Intact  Orientation:  Person, Place, Time and Situation  Reliability:  good  Insight:  Good  Judgement:  " Good  Impulse Control:  Good  Physical/Medical Issues:  No     Assessment & Plan   Problems Addressed this Visit    None  Visit Diagnoses       Bipolar II disorder    -  Primary    Relevant Medications    lamoTRIgine (LaMICtal) 200 MG tablet    ARIPiprazole (Abilify) 2 MG tablet    Trouble in sleeping        Relevant Medications    ARIPiprazole (Abilify) 2 MG tablet          Diagnoses         Codes Comments    Bipolar II disorder    -  Primary ICD-10-CM: F31.81  ICD-9-CM: 296.89     Trouble in sleeping     ICD-10-CM: G47.9  ICD-9-CM: 780.50           Functionality: pt having moderate impairment in important areas of daily functioning.  Prognosis: Good dependent on medication/follow up and treatment plan compliance.    He discussed nicotine usage and how it is not recommended.  He is going to try the nicotine replacements and if these do not work (the pouches) I would be happy to send him in some nicotine patches and he will let me know.  This was all discussed.    Adding some abilify for ongoing mood issues and depression.  For now stopping the atarax as it was not helping with sleep.  We went over the risks, benefits, side effects of the Abilify which include increased appetite, increased blood sugar, increased glucose, permanent movement disorders.  We discussed how if he continues to have problems with sleep we could add a small dose of Seroquel but were going to see how the addition of the Abilify helps with overall mood and sleep first.  He will continue the Lamictal for mood stabilization.  Refills have been submitted..  Refills have been submitted.     Continuing efforts to promote the therapeutic alliance, address the patient's issues, and strengthen self awareness, insights, and coping skills.    Patient is agreeable to call the Clinic with worsening symptoms.    Patient is aware to call 911 or go to the nearest ER should begin having SI/HI. rtc 6 weeks.  Sooner if needed               This document has  been electronically signed by ROBYN Whitaker on   July 15, 2024 17:11 EDT.

## 2024-08-12 ENCOUNTER — TELEMEDICINE (OUTPATIENT)
Dept: PSYCHIATRY | Facility: CLINIC | Age: 24
End: 2024-08-12
Payer: COMMERCIAL

## 2024-08-12 DIAGNOSIS — F31.81 BIPOLAR II DISORDER: Primary | ICD-10-CM

## 2024-08-12 PROCEDURE — 90837 PSYTX W PT 60 MINUTES: CPT | Performed by: SOCIAL WORKER

## 2024-08-12 NOTE — TREATMENT PLAN
Multi-Disciplinary Problems (from Behavioral Health Treatment Plan)      Active Problems       Problem: Mood Instability  Start Date: 08/12/24      Problem Details: The patient self-scales this problem as a 6 to 7 with 10 being the worst.          Goal Priority Start Date Expected End Date End Date    Patient will achieve mood stability as evidenced by controlled behavior and a more deliberate thought process -- 08/12/24 02/10/25 --    Goal Details: Progress toward goal:  The patient self-scales their progress related to this goal as a 6 to 7  with 10 being the worst.  He shares awareness of mood elevated but not manic.  Is able to sleep and feel rested.  Has plans to take college classes starting this fall through classes are 7 weeks and will figure out the work life balance-work pays for credit hours ie 130 credits        Goal Intervention Frequency Start Date End Date    Provide structure and focus to patient's thoughts and actions by establishing plans and routine. Q2 Weeks 08/12/24 --    Intervention Details: Duration of treatment until discharged.          Goal Intervention Frequency Start Date End Date    Assist patient in setting responsible goals and limits in behavior. Q2 Weeks 08/12/24 --    Intervention Details: Duration of treatment until discharged.                  Problem: Vocational Stress  Start Date: 08/12/24      Problem Details: The patient self-scales this problem as a 7 with 10 being the worst.          Goal Priority Start Date Expected End Date End Date    Patient will develop a constructive action plan that will reduce specific areas of work stress. -- 08/12/24 02/10/25 --    Goal Details: Progress toward goal:  The patient self-scales their progress related to this goal as a 7 with 10 being the worst.Patient has begun working on changing his mindset about work and seeing work as something he is doing to be able to get his college degree , a stepping stone.          Goal Intervention  Frequency Start Date End Date    Assist patient in identifying emotions and cognitive messages surrounding the work stress. Q2 Weeks 08/12/24 --    Intervention Details: Duration of treatment until discharged.          Goal Intervention Frequency Start Date End Date    Reinforce realistic self appraisal of patient's successes and challenges at work. Q2 Weeks 08/12/24 --    Intervention Details: Duration of treatment until discharged.                             Reassessment:  Patient had a recent medication adjustment and has noticed an improvement in his mood(perhaps a little elevated). He reports that he has been adjusting his view of work and this has helped. Vocational stress continues. Patient mood had been more depressed before medication adjustment.    I have discussed and reviewed this treatment plan with the patient.  It has been printed for signatures.
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
## 2024-08-26 ENCOUNTER — TELEMEDICINE (OUTPATIENT)
Dept: PSYCHIATRY | Facility: CLINIC | Age: 24
End: 2024-08-26
Payer: COMMERCIAL

## 2024-08-26 DIAGNOSIS — Z62.819 HISTORY OF ABUSE IN CHILDHOOD: ICD-10-CM

## 2024-08-26 DIAGNOSIS — F31.81 BIPOLAR II DISORDER: Primary | ICD-10-CM

## 2024-08-26 PROCEDURE — 90837 PSYTX W PT 60 MINUTES: CPT | Performed by: SOCIAL WORKER
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
## 2024-08-27 ENCOUNTER — OFFICE VISIT (OUTPATIENT)
Dept: PSYCHIATRY | Facility: CLINIC | Age: 24
End: 2024-08-27
Payer: COMMERCIAL

## 2024-08-27 VITALS
OXYGEN SATURATION: 98 % | SYSTOLIC BLOOD PRESSURE: 122 MMHG | DIASTOLIC BLOOD PRESSURE: 80 MMHG | HEART RATE: 109 BPM | BODY MASS INDEX: 23.76 KG/M2 | WEIGHT: 175.4 LBS | HEIGHT: 72 IN

## 2024-08-27 DIAGNOSIS — G47.9 TROUBLE IN SLEEPING: ICD-10-CM

## 2024-08-27 DIAGNOSIS — Z79.899 HIGH RISK MEDICATION USE: ICD-10-CM

## 2024-08-27 DIAGNOSIS — F31.81 BIPOLAR II DISORDER: Primary | ICD-10-CM

## 2024-08-27 DIAGNOSIS — Z62.819 HISTORY OF ABUSE IN CHILDHOOD: ICD-10-CM

## 2024-08-27 PROCEDURE — 99214 OFFICE O/P EST MOD 30 MIN: CPT | Performed by: NURSE PRACTITIONER

## 2024-08-27 RX ORDER — HYDROXYZINE HYDROCHLORIDE 50 MG/1
50 TABLET, FILM COATED ORAL NIGHTLY
Qty: 30 TABLET | Refills: 2 | Status: SHIPPED | OUTPATIENT
Start: 2024-08-27

## 2024-08-27 RX ORDER — LAMOTRIGINE 200 MG/1
200 TABLET ORAL DAILY
Qty: 30 TABLET | Refills: 1 | Status: SHIPPED | OUTPATIENT
Start: 2024-08-27

## 2024-08-27 RX ORDER — ARIPIPRAZOLE 2 MG/1
2 TABLET ORAL DAILY
Qty: 30 TABLET | Refills: 1 | Status: SHIPPED | OUTPATIENT
Start: 2024-08-27

## 2024-08-27 RX ORDER — HYDROXYZINE HYDROCHLORIDE 25 MG/1
TABLET, FILM COATED ORAL
Qty: 60 TABLET | Refills: 2 | OUTPATIENT
Start: 2024-08-27
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
## 2024-09-04 ENCOUNTER — LAB (OUTPATIENT)
Dept: FAMILY MEDICINE CLINIC | Facility: CLINIC | Age: 24
End: 2024-09-04
Payer: COMMERCIAL

## 2024-09-04 DIAGNOSIS — Z79.899 HIGH RISK MEDICATION USE: ICD-10-CM

## 2024-09-04 LAB
ALBUMIN SERPL-MCNC: 4.7 G/DL (ref 3.5–5.2)
ALBUMIN/GLOB SERPL: 2.6 G/DL
ALP SERPL-CCNC: 97 U/L (ref 39–117)
ALT SERPL W P-5'-P-CCNC: 18 U/L (ref 1–41)
ANION GAP SERPL CALCULATED.3IONS-SCNC: 10.6 MMOL/L (ref 5–15)
AST SERPL-CCNC: 20 U/L (ref 1–40)
BASOPHILS # BLD AUTO: 0.03 10*3/MM3 (ref 0–0.2)
BASOPHILS NFR BLD AUTO: 0.6 % (ref 0–1.5)
BILIRUB SERPL-MCNC: 0.3 MG/DL (ref 0–1.2)
BUN SERPL-MCNC: 13 MG/DL (ref 6–20)
BUN/CREAT SERPL: 11.4 (ref 7–25)
CALCIUM SPEC-SCNC: 9.7 MG/DL (ref 8.6–10.5)
CHLORIDE SERPL-SCNC: 106 MMOL/L (ref 98–107)
CHOLEST SERPL-MCNC: 158 MG/DL (ref 0–200)
CO2 SERPL-SCNC: 27.4 MMOL/L (ref 22–29)
CREAT SERPL-MCNC: 1.14 MG/DL (ref 0.76–1.27)
DEPRECATED RDW RBC AUTO: 40.7 FL (ref 37–54)
EGFRCR SERPLBLD CKD-EPI 2021: 92.1 ML/MIN/1.73
EOSINOPHIL # BLD AUTO: 0.07 10*3/MM3 (ref 0–0.4)
EOSINOPHIL NFR BLD AUTO: 1.5 % (ref 0.3–6.2)
ERYTHROCYTE [DISTWIDTH] IN BLOOD BY AUTOMATED COUNT: 12.5 % (ref 12.3–15.4)
GLOBULIN UR ELPH-MCNC: 1.8 GM/DL
GLUCOSE SERPL-MCNC: 88 MG/DL (ref 65–99)
HBA1C MFR BLD: 5.3 % (ref 4.8–5.6)
HCT VFR BLD AUTO: 48 % (ref 37.5–51)
HDLC SERPL-MCNC: 65 MG/DL (ref 40–60)
HGB BLD-MCNC: 15.9 G/DL (ref 13–17.7)
IMM GRANULOCYTES # BLD AUTO: 0 10*3/MM3 (ref 0–0.05)
IMM GRANULOCYTES NFR BLD AUTO: 0 % (ref 0–0.5)
LDLC SERPL CALC-MCNC: 81 MG/DL (ref 0–100)
LDLC/HDLC SERPL: 1.25 {RATIO}
LYMPHOCYTES # BLD AUTO: 2.26 10*3/MM3 (ref 0.7–3.1)
LYMPHOCYTES NFR BLD AUTO: 47.5 % (ref 19.6–45.3)
MCH RBC QN AUTO: 29.9 PG (ref 26.6–33)
MCHC RBC AUTO-ENTMCNC: 33.1 G/DL (ref 31.5–35.7)
MCV RBC AUTO: 90.4 FL (ref 79–97)
MONOCYTES # BLD AUTO: 0.36 10*3/MM3 (ref 0.1–0.9)
MONOCYTES NFR BLD AUTO: 7.6 % (ref 5–12)
NEUTROPHILS NFR BLD AUTO: 2.04 10*3/MM3 (ref 1.7–7)
NEUTROPHILS NFR BLD AUTO: 42.8 % (ref 42.7–76)
NRBC BLD AUTO-RTO: 0 /100 WBC (ref 0–0.2)
PLATELET # BLD AUTO: 200 10*3/MM3 (ref 140–450)
PMV BLD AUTO: 11.3 FL (ref 6–12)
POTASSIUM SERPL-SCNC: 4.4 MMOL/L (ref 3.5–5.2)
PROT SERPL-MCNC: 6.5 G/DL (ref 6–8.5)
RBC # BLD AUTO: 5.31 10*6/MM3 (ref 4.14–5.8)
SODIUM SERPL-SCNC: 144 MMOL/L (ref 136–145)
TRIGL SERPL-MCNC: 59 MG/DL (ref 0–150)
VLDLC SERPL-MCNC: 12 MG/DL (ref 5–40)
WBC NRBC COR # BLD AUTO: 4.76 10*3/MM3 (ref 3.4–10.8)

## 2024-09-04 PROCEDURE — 85025 COMPLETE CBC W/AUTO DIFF WBC: CPT | Performed by: NURSE PRACTITIONER

## 2024-09-04 PROCEDURE — 36415 COLL VENOUS BLD VENIPUNCTURE: CPT

## 2024-09-04 PROCEDURE — 80061 LIPID PANEL: CPT | Performed by: NURSE PRACTITIONER

## 2024-09-04 PROCEDURE — 80053 COMPREHEN METABOLIC PANEL: CPT | Performed by: NURSE PRACTITIONER

## 2024-09-04 PROCEDURE — 83036 HEMOGLOBIN GLYCOSYLATED A1C: CPT | Performed by: NURSE PRACTITIONER

## 2024-09-09 ENCOUNTER — TELEMEDICINE (OUTPATIENT)
Dept: PSYCHIATRY | Facility: CLINIC | Age: 24
End: 2024-09-09
Payer: COMMERCIAL

## 2024-09-09 DIAGNOSIS — Z62.819 HISTORY OF ABUSE IN CHILDHOOD: ICD-10-CM

## 2024-09-09 DIAGNOSIS — F31.81 BIPOLAR II DISORDER: Primary | ICD-10-CM

## 2024-09-09 DIAGNOSIS — G47.9 TROUBLE IN SLEEPING: ICD-10-CM

## 2024-09-09 PROCEDURE — 90837 PSYTX W PT 60 MINUTES: CPT | Performed by: SOCIAL WORKER
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
## 2024-09-26 ENCOUNTER — TELEMEDICINE (OUTPATIENT)
Dept: PSYCHIATRY | Facility: CLINIC | Age: 24
End: 2024-09-26
Payer: COMMERCIAL

## 2024-09-26 DIAGNOSIS — F31.81 BIPOLAR II DISORDER: Primary | ICD-10-CM

## 2024-09-26 DIAGNOSIS — Z62.819 HISTORY OF ABUSE IN CHILDHOOD: ICD-10-CM

## 2024-10-07 ENCOUNTER — TELEMEDICINE (OUTPATIENT)
Dept: PSYCHIATRY | Facility: CLINIC | Age: 24
End: 2024-10-07
Payer: COMMERCIAL

## 2024-10-07 DIAGNOSIS — F31.81 BIPOLAR II DISORDER: Primary | ICD-10-CM

## 2024-10-07 DIAGNOSIS — Z62.819 HISTORY OF ABUSE IN CHILDHOOD: ICD-10-CM

## 2024-10-07 PROCEDURE — 90837 PSYTX W PT 60 MINUTES: CPT | Performed by: SOCIAL WORKER
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
## 2024-10-21 ENCOUNTER — TELEMEDICINE (OUTPATIENT)
Dept: PSYCHIATRY | Facility: CLINIC | Age: 24
End: 2024-10-21
Payer: COMMERCIAL

## 2024-10-21 DIAGNOSIS — F31.81 BIPOLAR II DISORDER: Primary | ICD-10-CM

## 2024-10-21 DIAGNOSIS — Z62.819 HISTORY OF ABUSE IN CHILDHOOD: ICD-10-CM

## 2024-10-21 PROCEDURE — 90837 PSYTX W PT 60 MINUTES: CPT | Performed by: SOCIAL WORKER
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
## 2024-11-04 ENCOUNTER — TELEMEDICINE (OUTPATIENT)
Dept: PSYCHIATRY | Facility: CLINIC | Age: 24
End: 2024-11-04
Payer: COMMERCIAL

## 2024-11-04 DIAGNOSIS — F31.81 BIPOLAR II DISORDER: Primary | ICD-10-CM

## 2024-11-04 DIAGNOSIS — Z62.819 HISTORY OF ABUSE IN CHILDHOOD: ICD-10-CM
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
## 2024-11-04 NOTE — TREATMENT PLAN
Multi-Disciplinary Problems (from Behavioral Health Treatment Plan)      Active Problems       Problem: Mood Instability  Start Date: 11/04/24      Problem Details: The patient self-scales this problem as a 7 with 10 being the worst.          Goal Priority Start Date Expected End Date End Date    Patient will achieve mood stability as evidenced by controlled behavior and a more deliberate thought process -- 11/04/24 05/05/25 --    Goal Details: Progress toward goal:  The patient self-scales their progress related to this goal as a 5 with 10 being the worst.          Goal Intervention Frequency Start Date End Date    Provide structure and focus to patient's thoughts and actions by establishing plans and routine. PRN 11/04/24 --    Intervention Details: Duration of treatment until discharged.          Goal Intervention Frequency Start Date End Date    Assist patient in setting responsible goals and limits in behavior. PRN 11/04/24 --    Intervention Details: Duration of treatment until discharged.                  Problem: Vocational Stress  Start Date: 11/04/24      Problem Details: The patient self-scales this problem as a 7 with 10 being the worst.          Goal Priority Start Date Expected End Date End Date    Patient will develop a constructive action plan that will reduce specific areas of work stress. -- 11/04/24 05/05/25 --    Goal Details: Progress toward goal:  The patient self-scales their progress related to this goal as a 6 with 10 being the worst.          Goal Intervention Frequency Start Date End Date    Assist patient in identifying emotions and cognitive messages surrounding the work stress. PRN 11/04/24 --    Intervention Details: Duration of treatment until discharged.          Goal Intervention Frequency Start Date End Date    Reinforce realistic self appraisal of patient's successes and challenges at work. PRN 11/04/24 --    Intervention Details: Duration of treatment until discharged.                           Reviewed By       Matrina Boogie, GIANNI 11/04/24 [id number]                Patient continues to make progress at managing the natural frustration at work and trying to decide what is the   Best next step for his at work, to be able to make the money he would like. He has been able to manage his mood  With medication changes and increased aware of his moods and triggers.     I have discussed and reviewed this treatment plan with the patient.  It has been printed for signatures.

## 2024-11-18 ENCOUNTER — TELEMEDICINE (OUTPATIENT)
Dept: PSYCHIATRY | Facility: CLINIC | Age: 24
End: 2024-11-18
Payer: COMMERCIAL

## 2024-11-18 DIAGNOSIS — Z62.819 HISTORY OF ABUSE IN CHILDHOOD: ICD-10-CM

## 2024-11-18 DIAGNOSIS — F31.81 BIPOLAR II DISORDER: Primary | ICD-10-CM

## 2024-11-18 DIAGNOSIS — G47.9 TROUBLE IN SLEEPING: ICD-10-CM

## 2024-11-18 PROCEDURE — 90837 PSYTX W PT 60 MINUTES: CPT | Performed by: SOCIAL WORKER
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
## 2024-11-26 ENCOUNTER — OFFICE VISIT (OUTPATIENT)
Dept: PSYCHIATRY | Facility: CLINIC | Age: 24
End: 2024-11-26
Payer: COMMERCIAL

## 2024-11-26 VITALS
OXYGEN SATURATION: 99 % | BODY MASS INDEX: 25.57 KG/M2 | WEIGHT: 188.8 LBS | DIASTOLIC BLOOD PRESSURE: 78 MMHG | HEART RATE: 98 BPM | HEIGHT: 72 IN | SYSTOLIC BLOOD PRESSURE: 118 MMHG

## 2024-11-26 DIAGNOSIS — Z79.899 HIGH RISK MEDICATION USE: ICD-10-CM

## 2024-11-26 DIAGNOSIS — G47.9 TROUBLE IN SLEEPING: ICD-10-CM

## 2024-11-26 DIAGNOSIS — F31.81 BIPOLAR II DISORDER: Primary | ICD-10-CM

## 2024-11-26 DIAGNOSIS — Z62.819 HISTORY OF ABUSE IN CHILDHOOD: ICD-10-CM

## 2024-11-26 PROCEDURE — 99214 OFFICE O/P EST MOD 30 MIN: CPT | Performed by: NURSE PRACTITIONER

## 2024-11-26 RX ORDER — LAMOTRIGINE 200 MG/1
200 TABLET ORAL DAILY
Qty: 30 TABLET | Refills: 1 | Status: SHIPPED | OUTPATIENT
Start: 2024-11-26

## 2024-11-26 RX ORDER — HYDROXYZINE HYDROCHLORIDE 50 MG/1
50 TABLET, FILM COATED ORAL NIGHTLY
Qty: 30 TABLET | Refills: 2 | Status: SHIPPED | OUTPATIENT
Start: 2024-11-26

## 2024-11-26 RX ORDER — ARIPIPRAZOLE 5 MG/1
5 TABLET ORAL DAILY
Qty: 30 TABLET | Refills: 2 | Status: SHIPPED | OUTPATIENT
Start: 2024-11-26
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
## 2024-12-02 ENCOUNTER — TELEMEDICINE (OUTPATIENT)
Dept: PSYCHIATRY | Facility: CLINIC | Age: 24
End: 2024-12-02
Payer: COMMERCIAL

## 2024-12-02 DIAGNOSIS — F31.81 BIPOLAR II DISORDER: Primary | ICD-10-CM

## 2024-12-02 DIAGNOSIS — G47.9 TROUBLE IN SLEEPING: ICD-10-CM

## 2024-12-02 DIAGNOSIS — Z62.819 HISTORY OF ABUSE IN CHILDHOOD: ICD-10-CM

## 2024-12-02 PROCEDURE — 90837 PSYTX W PT 60 MINUTES: CPT | Performed by: SOCIAL WORKER
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
## 2024-12-16 ENCOUNTER — PATIENT MESSAGE (OUTPATIENT)
Dept: PSYCHIATRY | Facility: CLINIC | Age: 24
End: 2024-12-16
Payer: COMMERCIAL

## 2024-12-30 ENCOUNTER — TELEMEDICINE (OUTPATIENT)
Dept: PSYCHIATRY | Facility: CLINIC | Age: 24
End: 2024-12-30
Payer: COMMERCIAL

## 2024-12-30 DIAGNOSIS — G47.9 TROUBLE IN SLEEPING: ICD-10-CM

## 2024-12-30 DIAGNOSIS — F31.81 BIPOLAR II DISORDER: Primary | ICD-10-CM

## 2024-12-30 DIAGNOSIS — Z62.819 HISTORY OF ABUSE IN CHILDHOOD: ICD-10-CM

## 2024-12-30 PROCEDURE — 90837 PSYTX W PT 60 MINUTES: CPT | Performed by: SOCIAL WORKER
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
## 2025-01-13 ENCOUNTER — TELEMEDICINE (OUTPATIENT)
Dept: PSYCHIATRY | Facility: CLINIC | Age: 25
End: 2025-01-13
Payer: COMMERCIAL

## 2025-01-13 DIAGNOSIS — Z62.819 HISTORY OF ABUSE IN CHILDHOOD: ICD-10-CM

## 2025-01-13 DIAGNOSIS — G47.9 TROUBLE IN SLEEPING: ICD-10-CM

## 2025-01-13 DIAGNOSIS — F31.81 BIPOLAR II DISORDER: Primary | ICD-10-CM

## 2025-01-13 PROCEDURE — 90837 PSYTX W PT 60 MINUTES: CPT | Performed by: SOCIAL WORKER
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
## 2025-01-14 ENCOUNTER — TELEMEDICINE (OUTPATIENT)
Dept: PSYCHIATRY | Facility: CLINIC | Age: 25
End: 2025-01-14
Payer: COMMERCIAL

## 2025-01-14 DIAGNOSIS — F31.81 BIPOLAR II DISORDER: ICD-10-CM

## 2025-01-14 DIAGNOSIS — G47.9 TROUBLE IN SLEEPING: Chronic | ICD-10-CM

## 2025-01-14 PROCEDURE — 99214 OFFICE O/P EST MOD 30 MIN: CPT | Performed by: NURSE PRACTITIONER

## 2025-01-14 RX ORDER — ARIPIPRAZOLE 5 MG/1
5 TABLET ORAL DAILY
Qty: 30 TABLET | Refills: 1 | Status: SHIPPED | OUTPATIENT
Start: 2025-01-14

## 2025-01-14 RX ORDER — LAMOTRIGINE 200 MG/1
200 TABLET ORAL DAILY
Qty: 30 TABLET | Refills: 1 | Status: SHIPPED | OUTPATIENT
Start: 2025-01-14

## 2025-01-14 RX ORDER — QUETIAPINE FUMARATE 50 MG/1
TABLET, FILM COATED ORAL
Qty: 30 TABLET | Refills: 1 | Status: SHIPPED | OUTPATIENT
Start: 2025-01-14

## 2025-01-14 NOTE — PROGRESS NOTES
Subjective   Jens Rogel is a 24 y.o. male is here for an urgent visit.“This provider is located at [facility], [address] [place] [address]. The provider identified herself as well as her credentials.   The Patient is located at home. The patient's condition being diagnosed/treated is appropriate for telemedicine. The patient gave consent to be seen remotely, and when consent is given they understand that the consent allows for patient identifiable information to be sent to a third party as needed.   They may refuse to be seen remotely at any time. The electronic data is encrypted and password protected, and the patient has been advised of the potential risks to privacy not withstanding such measures.     Chief Complaint:  problems sleeping    HPI: Patient states that since going up on the Abilify to 5 mg he is not been able to sleep.  He states he will lay there with his eyes closed and will only get about 4 hours of sleep.  He denies a true ilan episode where he has increased energy and decreased need for sleep.  He is afraid that a depressive episode will follow since he is not getting enough sleep.  He has gotten a job promotion and is extremely excited about this and has bought a new vehicle.  This has been his plan for quite some time.  He really feels like the Abilify 5 mg works very well for him he states he feels like it really helps his mood stay even.  He denies any negative side effects to the meds other than not being able to sleep.  He does drink some caffeine in the mornings but is not drinking any past the early afternoon.  He has not had any medical stressors.  The hydroxyzine is not helping with sleep.    The following portions of the patient's history were reviewed and updated as appropriate: allergies, current medications, past family history, past medical history, past social history, past surgical history and problem list.    Review of Systems   Constitutional:   Negative for activity change, appetite change and fatigue.   HENT: Negative.     Eyes:  Negative for visual disturbance.   Respiratory: Negative.     Cardiovascular: Negative.    Gastrointestinal:  Negative for nausea.   Endocrine: Negative.    Genitourinary: Negative.    Musculoskeletal:  Negative for arthralgias.   Skin: Negative.    Allergic/Immunologic: Negative.    Neurological:  Negative for dizziness, seizures and headaches.   Hematological: Negative.    Psychiatric/Behavioral:  Positive for sleep disturbance. Negative for agitation, behavioral problems, confusion, decreased concentration, dysphoric mood, hallucinations, self-injury and suicidal ideas. The patient is nervous/anxious. The patient is not hyperactive.      Reviewed copied data and there are no changes      Objective   Physical Exam  Vitals reviewed.   Constitutional:       Appearance: Normal appearance.   Musculoskeletal:      Cervical back: Normal range of motion and neck supple.   Neurological:      General: No focal deficit present.      Mental Status: Jens is alert and oriented to person, place, and time.   Psychiatric:         Attention and Perception: Attention normal.         Mood and Affect: Mood normal.         Speech: Speech normal.         Behavior: Behavior normal. Behavior is cooperative.         Thought Content: Thought content normal.         Cognition and Memory: Cognition normal.         Judgment: Judgment normal.       There were no vitals taken for this visit.    Medication List:   Current Outpatient Medications   Medication Sig Dispense Refill    ARIPiprazole (Abilify) 5 MG tablet Take 1 tablet by mouth Daily. 30 tablet 1    lamoTRIgine (LaMICtal) 200 MG tablet Take 1 tablet by mouth Daily. 30 tablet 1    QUEtiapine (SEROquel) 50 MG tablet Take one half to one tablet nightly 30 tablet 1     No current facility-administered medications for this visit.     Reviewed copied data and there are no changes    Mental Status Exam:    Hygiene:   good  Cooperation:  Cooperative  Eye Contact:  Good  Psychomotor Behavior:  Appropriate  Affect:  Full range  Hopelessness: Denies  Speech:  Normal  Thought Process:  Goal directed  Thought Content:  Normal  Suicidal:  None  Homicidal:  None  Hallucinations:  None  Delusion:  None  Memory:  Intact  Orientation:  Person, Place, Time and Situation  Reliability:  good  Insight:  Good  Judgement:  Good  Impulse Control:  Good  Physical/Medical Issues:  No     Assessment & Plan   Problems Addressed this Visit    None  Visit Diagnoses       Bipolar II disorder        Relevant Medications    ARIPiprazole (Abilify) 5 MG tablet    lamoTRIgine (LaMICtal) 200 MG tablet    QUEtiapine (SEROquel) 50 MG tablet    Trouble in sleeping  (Chronic)       Relevant Medications    ARIPiprazole (Abilify) 5 MG tablet    QUEtiapine (SEROquel) 50 MG tablet          Diagnoses         Codes Comments    Bipolar II disorder     ICD-10-CM: F31.81  ICD-9-CM: 296.89     Trouble in sleeping     ICD-10-CM: G47.9  ICD-9-CM: 780.50           Functionality: pt having minimal impairment in important areas of daily functioning.  Prognosis: Good dependent on medication/follow up and treatment plan compliance.    We have a lengthy discussion.  We could go back on the Abilify to 2.5 mg however patient really does like the 5 mg and feels like this medicine really works well for him.  I am discontinuing the hydroxyzine as he states he is not taking it is not working.  Adding some Seroquel for sleep.  Risks, benefits, side effects of the medication were discussed.  He will start by taking half a tablet if that does not help he can take a whole tablet.  He will continue the Lamictal for mood stabilization.  Refills of all been submitted.    Continuing efforts to promote the therapeutic alliance, address the patient's issues, and strengthen self awareness, insights, and coping skills.    Patient is agreeable to call the Clinic with worsening symptoms.     Patient is aware to call 911 or go to the nearest ER should begin having SI/HI. rtc 5 weeks.  Sooner if needed               This document has been electronically signed by ROBYN Whitaker on   January 14, 2025 16:46 EST.

## 2025-01-28 ENCOUNTER — TELEMEDICINE (OUTPATIENT)
Dept: PSYCHIATRY | Facility: CLINIC | Age: 25
End: 2025-01-28
Payer: COMMERCIAL

## 2025-01-28 DIAGNOSIS — F31.81 BIPOLAR II DISORDER: Primary | ICD-10-CM

## 2025-01-28 NOTE — PROGRESS NOTES
[facility]  Outpatient Telehealth Progress Note   Patient Status:  Established  Name:  Jens Rogel  :  2000  Date of Service: 2025  Time In: 15:03 EST  Time Out: 1558     HIPAA: You have chosen to receive care through a video visit today. This provider is located at home address in connection with the [facility] (through [facility]), [address] [place] [address] The Patient is seen remotely via telehealth at home ([address] [place] [address] [place] [address]) using Epic/Twillo platforms and is in a secure environment for this session. The patient's condition being diagnosed/treated is appropriate for telemedicine. The provider identified herself and credentials GIANNI, GAMAL.  The patient and/or guardian consent(s) to be seen remotely, and when consent is given, Jens understand(s) consent allows for patient identifiable information to be sent to a third party as needed. Jens can refuse to be seen remotely at any time. The electronic data is encrypted and password protected, and the patient has been advised of the potential risks to privacy, not withstanding such measures.  You have chosen to receive care through a telehealth visit.  The patient has signed the video visit consent form.  The visit included audio and video interaction. No technical issues occurred during this visit.  Verified the patients identify using Name and date of birth. Patient states there are no changes the their insurance.     IDENTIFYING INFORMATION:  The patient is a 24 y.o. male who presents for  an outpatient follow-up appointment.      I, Jens Rogel, hereby acknowledge that I have the right to discuss the assessment, potential risks, and benefits of any recommended treatment.    Chief Complaint: Patient reports problems with a Bipolar Mood Disorder.     Session Goal:  Patient with explore and process thoughts/feelings/coping skills to prevent  "deterioration of mood and need for a higher level of care.    Subjective   HPI:  Patient arrived for session on time, clean and casually dressed without evidence of intoxication, withdrawal, or perceptual disturbance. Patient arrived as: age appropriate.  Patient indicates Jens is an open and willing participant in today's session.  Patient shares that physically he is good but mentally \"not so good\". Wants to find a way to take care of himself, identifies that he isnt able to accept the praise because there are too many other things he needs to do right now. He shares that he met with his supervisor to build a schedule to manage \"peak times\".  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 He shares that he is frustrated with current supervisors who don't take the initiative to run the floor.  He is setting clear and concise directions. He  has been doing everything he can to help them succeed which he does not think will happen.  He shares that school is a lot and he has decided to drop his minor.   Patient shares that he and  continue to work on the house and bought a new sectional for the basement. He and  are communicating better, splitting tasks around the house. 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  He shares that he is sleeping but is still feeling worn out from all of the stress. Identifies that when things are planned or structured he has more difficulty manage daily stressors. He reports besides being stressed his mood is not elevated or depressed.   Onset of symptoms was vague.  Symptoms are associated with lack of support.  Symptoms are aggravated by anxiety, sadness, and stress.   Symptoms improve with medication management and therapy Current rates severity of symptoms, on a scale of 1-10 (10 is the most severe) 7 Context Family and social history was reviewed  Quality been intermittent without a consistent pattern.    Substance Use: "   Recent labs:    Last Urine Toxicity           No data to display              Objective    Medical History: Areas Reviewed: The following portions of the patient's history were reviewed and updated as appropriate: allergies, current medications, past family history, past medical history, past social history, past surgical history, and problem list.    Vitals:  Weight:  No Weight Documented This Encounter  Height: No Height Documented This Encounter  BMI:  There is no height or weight on file to calculate BMI.  Education:  The benefits of a healthy diet and exercise were discussed with patient, especially the positive effects they have on mental health. Patient encouraged to consider lifestyle modification regarding  diet and exercise patterns to maximize results of mental health treatment.    Medication Review:    Current Outpatient Medications:     ARIPiprazole (Abilify) 5 MG tablet, Take 1 tablet by mouth Daily., Disp: 30 tablet, Rfl: 1    lamoTRIgine (LaMICtal) 200 MG tablet, Take 1 tablet by mouth Daily., Disp: 30 tablet, Rfl: 1    QUEtiapine (SEROquel) 50 MG tablet, Take one half to one tablet nightly, Disp: 30 tablet, Rfl: 1     Medication Compliance:  compliance with medication regimen;     Assessment & Plan    Trauma Assessment:  Patient denies having been hit, slapped, kicked or otherwise physically hurt by others since last visit as well as having been forced to engage in any unwanted sexual acts since last visit.       Risk Assessment:  Patient adamantly and convincingly denies having SI/HI with or without intent, plans, or means. Patient denies having Hallucinations/Illusions and Delusions.  Patient  denies self-harming behaviors      Risk Level: History obtained from: patient and chart review.  Due to historical context and reported clinical markers, it appears patient meets criteria for LOW RISK to engage in self-harm or harm to others.  It is recommended Lattie be evaluated and assessed each contact  for intent, plan, means and/or lethality each contact.    Behavior Health Review Of Systems:    Psychiatric/Behavioral: Negative for agitation, behavioral problems, decreased concentration, dysphoric mood, hallucinations, self-injury, sleep disturbance, suicidal ideas, negative for hyperactivity. Positive for agitation and stress. The patient is not nervous/anxious.    Pertinent items are noted in HPI.    MENTAL STATUS EXAM   General Appearance:  Cleanly groomed and dressed  Eye Contact:  Good eye contact  Attitude:  Cooperative  Motor Activity:  Other  Other Comment:  Video visit  Speech:  Normal rate, tone, volume  Language:  Spontaneous  Mood and affect:  Frustrated  Hopelessness:  Denies  Loneliness: Denies  Thought Process:  Logical, goal-directed and linear  Associations/ Thought Content:  No delusions  Hallucinations:  None  Suicidal Ideations:  Not present  Homicidal Ideation:  Not present  Sensorium:  Alert  Orientation:  Person, place, time and situation  Immediate Recall, Recent, and Remote Memory:  Intact  Attention Span/ Concentration:  Good  Fund of Knowledge:  Appropriate for age and educational level  Insight:  Good  Judgement:  Good  Reliability:  Good  Impulse Control:  Good     Treatment plan status:  Active   Treatment plan progress: Progressing  Prognosis: Guarded with Ongoing Treatment  Functional Status: Moderate impairment   Disposition:   Patient does not appear to be malingering.     Session Summary: Therapist met individually with patient this date. Discussed progress in treatment and any needs/concerns. Encouraged the patient to discuss/vent their feelings, frustrations, and fears concerning their ongoing issues and validated their feelings. Exploring what he thinks will be best for him right now. Wants to focus on work right now and plans to withdraw from the 2 classes this session and re-enroll this summer when things at work are more stable. Discussing ways to decrease stress and allow  "himself to \"breath\".  Assisted patient in processing above session content; acknowledged and normalized patient’s thoughts, feelings, and concerns. Encouraged the patient to document his wins each.  Identifies that he is fearful of losing his job and plans to clarify with boss timeline for expectations. Therapist applied CBT/REBT and Exploration of Coping Skills and encouraged the patient to use positive coping skills such as Reframe the way you are thinking about the problem, Keep calm by thinking, Demonstrate self-control, and Utilize resources/coping skills.    Allowed patient to freely discuss issues without interruption or judgment. Provided safe, confidential environment to facilitate the development of positive therapeutic relationship and encourage open, honest communication.     Visit Diagnosis/Plan    ICD-10-CM ICD-9-CM   1. Bipolar II disorder  F31.81 296.89     Clinical Maneuvering:  All treatment options available at [facility] explored and documented.  The benefits of a healthy diet and exercise were discussed with patient, especially the positive effects they have on mental health. Patient encouraged to consider lifestyle modification regarding  diet and exercise patterns to maximize results of mental health treatment.  Reviewed previous available documentation  Reviewed most recent available labs     Justification for therapy: Patient continues to struggle with a chronic/pervasive mental illness which continues to cause impairment in at least two important areas of functioning.  Patient appear(s) to maintain relative stability as compared to the  baseline measure.  Patient can reasonably be expected to continue to benefit from treatment and would likely be at increased risk for decompensation if treatment were stopped.  Patient endorses a positive benefit from therapy and appears to meet outpatient level of care. Patient expresses gratitude and reports Jens had a positive " experience today.    Recommended Referrals: Psychiatrist/APRN and Medical Provider (PCP)    Follow Up:  Return in about 2 weeks, or earlier if symptoms worsen or fail to improve for next follow up visit. [phone]      The patient understands that treatment is conditional on adhering to all Behavioral Health Outpatient Policy and Procedures.  The patient understands that providers/clinic has discretion to dismissed them from care if these are breached and a recommendation for further care will be made at time of discharge.    Future Appointments         Provider Department Center    2/11/2025 3:00 PM Martina Boogie LCSW [facility]    2/24/2025 4:00 PM Martina Boogie LCSW [facility]    2/27/2025 10:30 AM Negrita Mccabe APRN [facility]     3/11/2025 4:00 PM Martina Boogie LCSW [facility]    3/24/2025 4:00 PM Martina Boogie LCSW [facility]            This document electronically signed by Martina Osei LCSW, GAMAL January 28, 2025 15:50 EST    At [facility], we believe that sharing information builds trust and better relationships. You are receiving this note because you are receiving care at [facility] or have recently visited. It is possible you will see health information before a provider has talked with you about it. This kind of information can be easy to misunderstand as it is a medical document. It is intended as [id number] communication. It is written in medical language and may contain abbreviations or verbiage that are unfamiliar. It may appear blunt or direct. Medical documents are intended to carry relevant information, facts as evident, and the clinical opinion of the practitioner.  To help you fully understand what it means for your  health, we urge you to discuss this note with your provider    Alma Disclaimer:  Please note that portions of this documentation may have been completed with a voice recognition program.  Efforts were made to edit this dictation, but occasional words may have been mistranscribed.

## 2025-02-27 ENCOUNTER — OFFICE VISIT (OUTPATIENT)
Dept: PSYCHIATRY | Facility: CLINIC | Age: 25
End: 2025-02-27
Payer: COMMERCIAL

## 2025-02-27 VITALS
WEIGHT: 187.4 LBS | SYSTOLIC BLOOD PRESSURE: 114 MMHG | HEIGHT: 72 IN | OXYGEN SATURATION: 98 % | BODY MASS INDEX: 25.38 KG/M2 | HEART RATE: 88 BPM | DIASTOLIC BLOOD PRESSURE: 74 MMHG

## 2025-02-27 DIAGNOSIS — Z62.819 HISTORY OF ABUSE IN CHILDHOOD: ICD-10-CM

## 2025-02-27 DIAGNOSIS — G47.9 TROUBLE IN SLEEPING: ICD-10-CM

## 2025-02-27 DIAGNOSIS — F31.81 BIPOLAR II DISORDER: Primary | ICD-10-CM

## 2025-02-27 DIAGNOSIS — Z79.899 HIGH RISK MEDICATION USE: ICD-10-CM

## 2025-02-27 PROCEDURE — 99214 OFFICE O/P EST MOD 30 MIN: CPT | Performed by: NURSE PRACTITIONER

## 2025-02-27 RX ORDER — LAMOTRIGINE 200 MG/1
200 TABLET ORAL DAILY
Qty: 30 TABLET | Refills: 2 | Status: SHIPPED | OUTPATIENT
Start: 2025-02-27

## 2025-02-27 RX ORDER — QUETIAPINE FUMARATE 50 MG/1
TABLET, FILM COATED ORAL
Qty: 30 TABLET | Refills: 2 | Status: SHIPPED | OUTPATIENT
Start: 2025-02-27

## 2025-02-27 RX ORDER — ARIPIPRAZOLE 5 MG/1
5 TABLET ORAL DAILY
Qty: 30 TABLET | Refills: 2 | Status: SHIPPED | OUTPATIENT
Start: 2025-02-27
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
## 2025-03-11 ENCOUNTER — TELEMEDICINE (OUTPATIENT)
Dept: PSYCHIATRY | Facility: CLINIC | Age: 25
End: 2025-03-11
Payer: COMMERCIAL

## 2025-03-11 DIAGNOSIS — F31.81 BIPOLAR II DISORDER: Primary | ICD-10-CM

## 2025-03-11 NOTE — PROGRESS NOTES
[facility]  Outpatient Telehealth Progress Note   Patient Status:  Established  Name:  Jens Rogel  :  2000  Date of Service: 3/11/2025  Time In: 16:02 EDT  Time Out: 1659     HIPAA: You have chosen to receive care through a video visit today. This provider is located at home address in connection with the [facility] (through [facility] [address] [place] [address] The Patient is seen remotely via telehealth at home ([address] [place] [address] [place] [address]) using myVBO/Savored platforms and is in a secure environment for this session. The patient's condition being diagnosed/treated is appropriate for telemedicine. The provider identified herself and credentials GAMAL ARCHER.  The patient and/or guardian consent(s) to be seen remotely, and when consent is given, Jens understand(s) consent allows for patient identifiable information to be sent to a third party as needed. Jens can refuse to be seen remotely at any time. The electronic data is encrypted and password protected, and the patient has been advised of the potential risks to privacy, not withstanding such measures.  The patient has signed the video visit consent form.  The visit included audio and video interaction. No technical issues occurred during this visit.     Verified the patients identify using Name and date of birth. Patient states there are no changes the their insurance.     IDENTIFYING INFORMATION:  The patient is a 24 y.o. male who presents for  an outpatient follow-up appointment.      I, Jens Rogel, hereby acknowledge that I have the right to discuss the assessment, potential risks, and benefits of any recommended treatment.    Chief Complaint: Patient reports problems with anxiety and a Bipolar Mood Disorder.     Session Goal:  Patient with explore and process thoughts/feelings/coping skills to prevent deterioration of mood and need for a higher level of  care.    Subjective   HPI:  Patient arrived for session on time, clean and casually dressed without evidence of intoxication, withdrawal, or perceptual disturbance. Patient arrived as: age appropriate.  Patient indicates Jens is an open and willing participant in today's session.    History of Present Illness  The patient is a 24-year-old male who presents via virtual visit for evaluation of depression.  He reports experiencing significant stress, which he attributes to his current employment situation. He expresses feelings of inadequacy and self-doubt, despite receiving positive feedback from his superiors and peers. He feels overwhelmed by the demands of his job and perceives himself as being unable to keep up with expectations. He has been attempting to internalize the positive feedback he receives but finds it challenging. He also reports a dislike for interstate travel to and from work, just another stressor.   He believes that his stress levels would decrease if he could afford himself more leniency. He experiences feelings of inadequacy when he requires assistance or lacks knowledge in certain areas. He recalls these feelings originating from his childhood. He expresses dissatisfaction with his current life circumstances, despite achieving many of his goals. He reports that several managers are stepping down, which he finds distressing. He is currently focusing on improving his peak times at work. 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 He plans to terminate an employee who has left money out of the safe overnight twice. He also plans to revise his work schedule. He acknowledges that his depression may be contributing to his current state of mind. He rates his depression as a 7 on a scale of 1 to 10. He reports excessive sleep, exceeding 12 hours, and a lack of appetite.  He has been working as a   for the past 6 to 8 weeks and feels overwhelmed by the responsibilities. He reports that his depression worsens during this time of year due to fluctuating weather conditions. He has been experiencing a low mood for approximately 2 weeks. His partner has recently switched to night shifts, which has limited their time together. He reports that his mood has been particularly low over the past 2 weeks. He believes that his current situation is exacerbating his depression. He reports that his partner is supportive and understanding. He is currently on Abilify and has been advised to wait and see if warmer weather improves his mood before increasing the dosage. .He believes that a vacation in a [place], warm location would improve his mood. He plans to spend time with friends on Friday.            Substance Use:  alcohol use occasionally.  Recent labs:    Last Urine Toxicity           No data to display              Objective    Medical History: Areas Reviewed: The following portions of the patient's history were reviewed and updated as appropriate: allergies, current medications, past family history, past medical history, past social history, past surgical history, and problem list.    Vitals:  Weight:  No Weight Documented This Encounter  Height: No Height Documented This Encounter  BMI:  There is no height or weight on file to calculate BMI.  Education:  The benefits of a healthy diet and exercise were discussed with patient, especially the positive effects they have on mental health. Patient encouraged to consider lifestyle modification regarding  diet and exercise patterns to maximize results of mental health treatment.    Medication Review:    Current Outpatient Medications:     ARIPiprazole (Abilify) 5 MG tablet, Take 1 tablet by mouth Daily., Disp: 30 tablet, Rfl: 2    lamoTRIgine (LaMICtal) 200 MG tablet, Take 1 tablet by mouth Daily., Disp: 30 tablet, Rfl: 2    QUEtiapine (SEROquel) 50 MG tablet, Take one half to  one tablet nightly, Disp: 30 tablet, Rfl: 2     Medication Compliance:  compliance with medication regimen; S  Assessment & Plan    Trauma Assessment:  Patient denies having been hit, slapped, kicked or otherwise physically hurt by others since last visit as well as having been forced to engage in any unwanted sexual acts since last visit.       Risk Assessment:  Patient adamantly and convincingly denies having SI/HI with or without intent, plans, or means. Patient denies having Hallucinations/Illusions and Delusions.  Patient  denies self-harming behaviors      Risk Level: History obtained from: patient and chart review.  Due to historical context and reported clinical markers, it appears patient meets criteria for LOW RISK to engage in self-harm or harm to others.  It is recommended Lattie be evaluated and assessed each contact for intent, plan, means and/or lethality each contact.      Behavior Health Review Of Systems:  Psychiatric/Behavioral: Negative for agitation, behavioral problems, decreased concentration, dysphoric mood, hallucinations, self-injury, sleep disturbance, suicidal ideas, negative for hyperactivity. Positive for depressed mood, sleep disturbance and stress. The patient is nervous/anxious.    Pertinent items are noted in HPI.    MENTAL STATUS EXAM   General Appearance:  Cleanly groomed and dressed  Eye Contact:  Good eye contact  Attitude:  Cooperative  Motor Activity:  Other  Other Comment:  Video visit  Speech:  Normal rate, tone, volume  Language:  Spontaneous  Mood and affect:  Normal, pleasant  Hopelessness:  Denies  Loneliness: Denies  Thought Process:  Logical, goal-directed and linear  Associations/ Thought Content:  No delusions  Hallucinations:  None  Suicidal Ideations:  Not present  Homicidal Ideation:  Not present  Sensorium:  Alert  Orientation:  Person, place, time and situation  Immediate Recall, Recent, and Remote Memory:  Intact  Attention Span/ Concentration:  Good  Fund of  Knowledge:  Appropriate for age and educational level  Insight:  Good  Judgement:  Good  Reliability:  Good  Impulse Control:  Good       Treatment plan status:  Quarterly Review 03/12/25   Treatment plan progress: Progressing  Prognosis: Fair with Ongoing Treatment   Functional Status: Mild impairment   Disposition:   Patient does not appear to be malingering.     Session Summary: Therapist met individually with patient this date. Discussed progress in treatment and any needs/concerns.   Encouraged the patient to discuss/vent their feelings, frustrations, and fears concerning their ongoing issues and validated their feelings.    Assisted patient in processing above session content; acknowledged and normalized patient’s thoughts, feelings, and concerns.   Therapist applied CBT/REBT and Exploration of Emotions and encouraged the patient to use positive coping skills such as Spending time in nature, Reframe the way you are thinking about the problem, Take deep breaths, and Demonstrate self-control.    Allowed patient to freely discuss issues without interruption or judgment. Provided safe, confidential environment to facilitate the development of positive therapeutic relationship and encourage open, honest communication.     Visit Diagnosis/Plan    ICD-10-CM ICD-9-CM   1. Bipolar II disorder  F31.81 296.89     Clinical Maneuvering:  All treatment options available at [facility] explored and documented.  The benefits of a healthy diet and exercise were discussed with patient, especially the positive effects they have on mental health. Patient encouraged to consider lifestyle modification regarding  diet and exercise patterns to maximize results of mental health treatment.  Reviewed previous available documentation  Reviewed most recent available labs     Assessment & Plan  1. Bipolar Disorder-Depression.  Her depressive symptoms appear to be exacerbated by the internal pressure she imposes on  herself, which may be contributing to her overall low mood. He reports a depression level of 7 out of 10, with symptoms including sleeping more than 12 hours and not eating. He has been advised to wait and see if the warm weather helps before considering an increase in his Abilify dosage. He is encouraged to engage in self-care activities and positive self-talk to mitigate these symptoms. He is also advised to acknowledge his accomplishments and progress in her new role as a , giving himself a timeline of 6 months before reevaluating her performance.      Justification for therapy: Patient continues to struggle with a chronic/pervasive mental illness which continues to cause impairment in at least two important areas of functioning.  Patient appear(s) to maintain relative stability as compared to the  baseline measure.  Patient can reasonably be expected to continue to benefit from treatment and would likely be at increased risk for decompensation if treatment were stopped.  Patient endorses a positive benefit from therapy and appears to meet outpatient level of care. Patient expresses gratitude and reports [place] had a positive experience today.    Recommended Referrals: Psychiatrist/APRN    Follow Up:  Return in about 2 weeks, or earlier if symptoms worsen or fail to improve for next follow up visit. [phone]      The patient understands that treatment is conditional on adhering to all Behavioral Health Outpatient Policy and Procedures.  The patient understands that providers/clinic has discretion to dismissed them from care if these are breached and a recommendation for further care will be made at time of discharge.    Future Appointments         Provider Department Center    3/24/2025 4:00 PM Martina Boogie LCSW [facility]    4/28/2025 4:00 PM Martina Boogie LCSW [facility]    5/13/2025 4:00  PM Martina Boogie LCSW [facility]    5/27/2025 9:00 AM Nergita Mccabe APRN [facility]     5/28/2025 4:00 PM Martina Boogie LCSW [facility]    6/10/2025 4:00 PM Martina Boogie LCSW [facility]            This document electronically signed by Martina Osei LCSW, [facility] March 12, 2025 07:58 EDT    Patient or patient representative verbalized consent for the use of Ambient Listening during the visit with  Martina Osei LCSW for chart documentation. 3/12/2025  16:02 EDT    At [facility], we believe that sharing information builds trust and better relationships. You are receiving this note because you are receiving care at [facility] or have recently visited. It is possible you will see health information before a provider has talked with you about it. This kind of information can be easy to misunderstand as it is a medical document. It is intended as [id number]-[id number] communication. It is written in medical language and may contain abbreviations or verbiage that are unfamiliar. It may appear blunt or direct. Medical documents are intended to carry relevant information, facts as evident, and the clinical opinion of the practitioner.  To help you fully understand what it means for your health, we urge you to discuss this note with your provider

## 2025-03-11 NOTE — PLAN OF CARE
Problem: Mood Instability  Goal: Patient will achieve mood stability as evidenced by controlled behavior and a more deliberate thought process  Outcome: Progressing     Problem: Vocational Stress  Goal: Patient will develop a constructive action plan that will reduce specific areas of work stress.  Outcome: Progressing

## 2025-03-11 NOTE — TREATMENT PLAN
Multi-Disciplinary Problems (from Behavioral Health Treatment Plan)      Active Problems       Problem: Mood Instability  Start Date: 03/11/25      Problem Details: The patient self-scales this problem as a 7 with 10 being the worst.          Goal Priority Start Date Expected End Date End Date    Patient will achieve mood stability as evidenced by controlled behavior and a more deliberate thought process -- 03/11/25 09/09/25 --    Goal Details: Progress toward goal:  The patient self-scales their progress related to this goal as a 7 with 10 being the worst  Mood has been low for the past few weeks.  He acknowledges that the erratic weather patterns contribute to his lower mood.   Mood has been more positive that current.  He has been sleeping 12 hours or more. He is working with his prescriber regarding his mood.        Goal Intervention Frequency Start Date End Date    Provide structure and focus to patient's thoughts and actions by establishing plans and routine. Each Visit 03/11/25 --    Intervention Details: Duration of treatment until discharged.        Goal Intervention Frequency Start Date End Date    Assist patient in setting responsible goals and limits in behavior. Each Visit 03/11/25 --    Intervention Details: Duration of treatment until discharged.                Problem: Vocational Stress  Start Date: 03/11/25      Problem Details: The patient self-scales this problem as a 7 with 10 being the worst.          Goal Priority Start Date Expected End Date End Date    Patient will develop a constructive action plan that will reduce specific areas of work stress. -- 03/11/25 09/09/25 --    Goal Details: Progress toward goal:  The patient self-scales their progress related to this goal as a 8 with 10 being the worst.  Patient was promoted to a          Goal Intervention Frequency Start Date End Date    Assist patient in identifying emotions and cognitive messages surrounding the work stress. Each  Visit 03/11/25 --    Intervention Details: Duration of treatment until discharged.        Goal Intervention Frequency Start Date End Date    Reinforce realistic self appraisal of patient's successes and challenges at work. Each Visit 03/11/25 --    Intervention Details: Duration of treatment until discharged.                        Reviewed By       Martina Boogie, GIANNI 03/11/25 [id number]                During the assessment period the patient has moved from primary location to another location only to be at new location  For the last month.  The changes have been very stressful for him. Shares that he is noticing that he is having more  Depression with the erratic weather hoping this will resolve when weather is warmer.     I have discussed and reviewed this treatment plan with the patient.

## 2025-03-24 ENCOUNTER — TELEMEDICINE (OUTPATIENT)
Dept: PSYCHIATRY | Facility: CLINIC | Age: 25
End: 2025-03-24
Payer: COMMERCIAL

## 2025-03-24 DIAGNOSIS — F31.81 BIPOLAR II DISORDER: Primary | ICD-10-CM

## 2025-03-24 PROCEDURE — 90837 PSYTX W PT 60 MINUTES: CPT | Performed by: SOCIAL WORKER

## 2025-04-09 ENCOUNTER — TELEMEDICINE (OUTPATIENT)
Dept: PSYCHIATRY | Facility: CLINIC | Age: 25
End: 2025-04-09
Payer: COMMERCIAL

## 2025-04-09 DIAGNOSIS — F31.81 BIPOLAR II DISORDER: Primary | ICD-10-CM

## 2025-04-09 NOTE — PROGRESS NOTES
[facility]  Outpatient Telehealth Progress Note   Patient Status:  Established  Name:  Jens Rogel  :  2000  Date of Service: 2025  Time In: 15:59 EDT  Time Out: 4:45     HIPAA: You have chosen to receive care through a video visit today. This provider is located at home address in connection with the [facility] (through [facility]), [address] [place] [address] The Patient is seen remotely via telehealth at home ([address] [place] [address] [place] [address]) using [employer] Video platforms and is in a secure environment for this session. The patient's condition being diagnosed/treated is appropriate for telemedicine. The provider identified herself and credentials GAMAL ARCHER.  The patient and/or guardian consent(s) to be seen remotely, and when consent is given, Jens understand(s) consent allows for patient identifiable information to be sent to a third party as needed. Jens can refuse to be seen remotely at any time. The electronic data is encrypted and password protected, and the patient has been advised of the potential risks to privacy, not withstanding such measures.    The patient has signed the video visit consent form.  The visit included audio and video interaction. Technical issues occurred during this visit.     Verified the patients identify using Name and date of birth. Patient states there are no changes the their insurance.     IDENTIFYING INFORMATION:  The patient is a 24 y.o. male who presents for  an outpatient follow-up appointment.      I, Jens Rogel, hereby acknowledge that I have the right to discuss the assessment, potential risks, and benefits of any recommended treatment.    Chief Complaint: Patient reports problems with a Bipolar Mood Disorder.     Session Goal:  Patient with explore and process thoughts/feelings/coping skills to prevent deterioration of mood and need for a higher level of  care.    Subjective     History of Present Illness  The patient presents via psychotherapy virtual visit for of bipolar disorder.      HPI:  Patient arrived for session on time, clean and casually dressed without evidence of intoxication, withdrawal, or perceptual disturbance. Patient arrived as: age appropriate.  Patient indicates Jens is an open and willing participant in today's session.    He reports a challenging week, exacerbated by the recent death of his uncle due to throat cancer. He expresses a desire for isolation but acknowledges that this may not be beneficial for his mental health. He describes a lack of mental energy to engage in conversation, which he attributes to the news of his uncle's passing. He recounts the circumstances of his death, including a smothering spell, a six-hour period of unconsciousness, and a ten-minute coding episode. He was declared brain dead on either  or Monday and was removed from the ventilator the following day, with his death occurring six hours later. He left work early to process this event. Today, he experienced feelings of hatred and agitation, particularly when faced with tasks such as mowing the lawn. He also reports stress related to his grandmother's grief over the loss of her third child. He admits to a fear of death and guilt over not having seen his uncle in several years. He feels that his progress in managing his bipolar disorder has been halted due to these events. He has discussed with his partner the need for increased intimacy, which he finds comforting. He does not engage in any hobbies and does not wish to start any. He plans to spend time with his partner on Saturday and attend his uncle's  on . He expresses a desire to walk and read a book. He reports no hypomanic episodes. He describes feeling nauseous and emotionally drained. He admits to control issues and finds music overstimulating. 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   Onset of symptoms was vague.  Symptoms are associated with death of his Uncle and lack of support.  Symptoms are aggravated by anxiety, lonely, sadness, and stress.   Symptoms improve with medication management, therapy, and personal self-care (wellness) Current rates severity of symptoms, on a scale of 1-10 (10 is the most severe) 6 Context Family and social history was reviewed  Quality been intermittent without a consistent pattern.                 Substance Use: no reported use/  Recent labs:    Last Urine Toxicity           No data to display              Objective    Medical History: Areas Reviewed: The following portions of the patient's history were reviewed and updated as appropriate: allergies, current medications, past family history, past medical history, past social history, past surgical history, and problem list.    Vitals:  Weight:  No Weight Documented This Encounter  Height: No Height Documented This Encounter  BMI:  There is no height or weight on file to calculate BMI.  Education:  The benefits of a healthy diet and exercise were discussed with patient, especially the positive effects they have on mental health. Patient encouraged to consider lifestyle modification regarding  diet and exercise patterns to maximize results of mental health treatment.    Medication Review:    Current Outpatient Medications:     ARIPiprazole (Abilify) 5 MG tablet, Take 1 tablet by mouth Daily., Disp: 30 tablet, Rfl: 2    lamoTRIgine (LaMICtal) 200 MG tablet, Take 1 tablet by mouth Daily., Disp: 30 tablet, Rfl: 2    QUEtiapine (SEROquel) 50 MG tablet, Take one half to one tablet nightly, Disp: 30 tablet, Rfl: 2     Medication Compliance:  compliance with medication regimen; S  Assessment & Plan    Trauma Assessment:  Patient denies having been hit, slapped, kicked or otherwise physically hurt by others since last visit as well as  having been forced to engage in any unwanted sexual acts since last visit.       Risk Assessment:  Patient adamantly and convincingly denies having SI/HI with or without intent, plans, or means. Patient denies having Hallucinations/Illusions and Delusions.  Patient  denies self-harming behaviors     Risk Level: History obtained from: patient and chart review.  Due to historical context and reported clinical markers, it appears patient meets criteria for LOW RISK to engage in self-harm or harm to others.  It is recommended Lattie be evaluated and assessed each contact for intent, plan, means and/or lethality each contact.      Behavior Health Review Of Systems:  Psychiatric/Behavioral: Negative for agitation, behavioral problems, decreased concentration, dysphoric mood, hallucinations, self-injury, sleep disturbance, suicidal ideas, negative for hyperactivity. Positive for stress. The patient is nervous/anxious.    Pertinent items are noted in HPI.    MENTAL STATUS EXAM   General Appearance:  Cleanly groomed and dressed  Eye Contact:  Good eye contact  Attitude:  Cooperative  Motor Activity:  Other  Other Comment:  Video visit  Speech:  Normal rate, tone, volume  Language:  Spontaneous  Hopelessness:  Denies  Loneliness: Denies  Thought Process:  Logical, goal-directed and linear  Associations/ Thought Content:  No delusions  Hallucinations:  None  Suicidal Ideations:  Not present  Homicidal Ideation:  Not present  Sensorium:  Alert  Orientation:  Person, place, time and situation  Immediate Recall, Recent, and Remote Memory:  Intact  Attention Span/ Concentration:  Good  Fund of Knowledge:  Appropriate for age and educational level  Insight:  Good  Judgement:  Good  Reliability:  Good  Impulse Control:  Good       Treatment plan status:  Active   Treatment plan progress: Progressing  Prognosis: Fair with Ongoing Treatment   Functional Status: Mild impairment   Disposition:   Patient does not appear to be  malingering.     Session Summary: Therapist met individually with patient this date. Discussed progress in treatment and any needs/concerns.   Encouraged the patient to discuss/vent their feelings, frustrations, and fears concerning their ongoing issues and validated their feelings.    Assisted patient in processing above session content; acknowledged and normalized patient’s thoughts, feelings, and concerns.  Therapist applied Exploration of Coping Skills, Mindfulness Training, and Psycho-Education and encouraged the patient to use positive coping skills such as Exercising, Spending time in nature, Reframe the way you are thinking about the problem, Keep a positive attitude, Take deep breaths, and Utilize resources/coping skills.  Allowed patient to freely discuss issues without interruption or judgment. Provided safe, confidential environment to facilitate the development of positive therapeutic relationship and encourage open, honest communication. At the end of session patients mood was less irritable with a goal of going for a walk after the session.    Visit Diagnosis/Plan    ICD-10-CM ICD-9-CM   1. Bipolar II disorder  F31.81 296.89     Clinical Maneuvering:  All treatment options available at [facility] explored and documented.  The benefits of a healthy diet and exercise were discussed with patient, especially the positive effects they have on mental health. Patient encouraged to consider lifestyle modification regarding  diet and exercise patterns to maximize results of mental health treatment.  Reviewed previous available documentation  Reviewed most recent available labs     Assessment & Plan  1. Bipolar disorder.  His mood is currently low, likely due to the recent bereavement of his uncle. However, there are no indications of hypomania at this time. He has demonstrated commendable insight into his condition and has identified strategies that could potentially alleviate his  symptoms. He is advised to engage in physical activities such as walking and exercise, which may help manage his irritability. Additionally, he is encouraged to maintain a healthy diet and hydrate adequately. The use of headphones without volume or lawnmower ear protection is suggested to manage overstimulation from sound. He is also advised to lower the lights and engage in relaxing activities such as watching television or reading. Encouraged the patient to keep talking to spouse letting them know what he needs.      Justification for therapy: Patient continues to struggle with a chronic/pervasive mental illness which continues to cause impairment in at least two important areas of functioning.  Patient appear(s) to maintain relative stability as compared to the  baseline measure.  Patient can reasonably be expected to continue to benefit from treatment and would likely be at increased risk for decompensation if treatment were stopped.  Patient endorses a positive benefit from therapy and appears to meet outpatient level of care. Patient expresses gratitude and reports Lattie had a positive experience today.    Recommended Referrals: Psychiatrist/APRN and Medical Provider (PCP)    Follow Up:  Return in about 2-4 weeks, or earlier if symptoms worsen or fail to improve for next follow up visit. [phone]      The patient understands that treatment is conditional on adhering to all Behavioral Health Outpatient Policy and Procedures.  The patient understands that providers/clinic has discretion to dismissed them from care if these are breached and a recommendation for further care will be made at time of discharge.    Future Appointments         Provider Department Center    4/28/2025 4:00 PM Martina Boogie LCSW [facility]    5/13/2025 4:00 PM Martina Boogie LCSW [facility]    5/27/2025 9:00 AM Negrita Mccabe,  APRN [facility]     5/28/2025 4:00 PM Martina Boogie LCSW [facility]    6/10/2025 4:00 PM Martina Boogie LCSW [facility]            This document electronically signed by Martina Osei LCSW, [facility] April 9, 2025 16:39 EDT    Patient or patient representative verbalized consent for the use of Ambient Listening during the visit with  Martina Osei LCSW for chart documentation. 4/9/2025  16:22 EDT    At [facility], we believe that sharing information builds trust and better relationships. You are receiving this note because you are receiving care at [facility] or have recently visited. It is possible you will see health information before a provider has talked with you about it. This kind of information can be easy to misunderstand as it is a medical document. It is intended as [id number] communication. It is written in medical language and may contain abbreviations or verbiage that are unfamiliar. It may appear blunt or direct. Medical documents are intended to carry relevant information, facts as evident, and the clinical opinion of the practitioner.  To help you fully understand what it means for your health, we urge you to discuss this note with your provider

## 2025-04-28 ENCOUNTER — TELEMEDICINE (OUTPATIENT)
Dept: PSYCHIATRY | Facility: CLINIC | Age: 25
End: 2025-04-28
Payer: COMMERCIAL

## 2025-04-28 DIAGNOSIS — G47.9 TROUBLE IN SLEEPING: ICD-10-CM

## 2025-04-28 DIAGNOSIS — F31.81 BIPOLAR II DISORDER: Primary | ICD-10-CM

## 2025-04-28 PROCEDURE — 90837 PSYTX W PT 60 MINUTES: CPT | Performed by: SOCIAL WORKER

## 2025-04-28 NOTE — PROGRESS NOTES
[facility]  Outpatient Telehealth Progress Note   Patient Status:  Established  Name:  Jens Rogel  :  2000  Date of Service: 2025  Time In: 16:00 EDT  Time Out: 1655     HIPAA: You have chosen to receive care through a video visit today. This provider is located at home address in connection with the [facility] (through [facility]), [address] [place] [address] The Patient is seen remotely via telehealth at home ([address] [place] [address] [place] [id number]) using [employer] platforms and is in a secure environment for this session. The patient's condition being diagnosed/treated is appropriate for telemedicine. The provider identified herself and credentials GAMAL ARCHER.  The patient and/or guardian consent(s) to be seen remotely, and when consent is given, Jens understand(s) consent allows for patient identifiable information to be sent to a third party as needed. Jens can refuse to be seen remotely at any time. The electronic data is encrypted and password protected, and the patient has been advised of the potential risks to privacy, not withstanding such measures.  The patient has signed the video visit consent form.  The visit included audio and video interaction. No technical issues occurred during this visit.     Verified the patients identify using Name and date of birth. Patient states there are no changes the their insurance.     IDENTIFYING INFORMATION:  The patient is a 25 y.o. male who presents for  an outpatient follow-up appointment.      I, Jens Rogel, hereby acknowledge that I have the right to discuss the assessment, potential risks, and benefits of any recommended treatment.      Chief Complaint: Patient reports problems with a Bipolar Mood Disorder, feeling tired.    Session Goal:  Patient with explore and process thoughts/feelings/coping skills to prevent deterioration of mood and need for a higher level  of care.    Subjective   HPI:        History of Present Illness  The patient presents for a virtual follow-up psychotherapy visit for bipolar 2 disorder.Patient arrived for session on time, clean and casually dressed without evidence of intoxication, withdrawal, or perceptual disturbance. Patient arrived as: age appropriate.  Patient indicates Elaina is an open and willing participant in today's session.   The chief complaint is persistent fatigue despite satisfactory sleep patterns. Mood remains stable, with no significant feelings of irritability, agitation, or depression. However, a lack of motivation is noted, lower mood. Increased stress is reported; recent uveitis diagnosis, stress from work and pressure to fix the problems to improve metrics they way he wants. . 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 Irritability is identified as a potential trigger for mood fluctuations, particularly in the relationship with Cayden. Intimacy is found beneficial during periods of low mood. Gardening is engaged in as a therapeutic activity.    Social History:  - Reports feeling disconnected from Cayden due to conflicting work schedules.  - Recently went on a kayaking trip with Cayden.  - Plans to schedule monthly activities to spend more time together.  - Recently acquired a new puppy, which is doing well with potty training.    Substance Use: social drinking alcohol  Recent labs:    Last Urine Toxicity           No data to display              Objective    Medical History: Areas Reviewed: The following portions of the patient's history were reviewed and updated as appropriate: allergies, current medications, past family history, past medical history, past social history,  past surgical history, and problem list.    Vitals:  Weight:  No Weight Documented This Encounter  Height: No Height Documented This Encounter  BMI:  There is no height or weight on file to calculate BMI.  Education:  The benefits of a healthy diet and exercise were discussed with patient, especially the positive effects they have on mental health. Patient encouraged to consider lifestyle modification regarding  diet and exercise patterns to maximize results of mental health treatment.    Medication Review:    Current Outpatient Medications:     ARIPiprazole (Abilify) 5 MG tablet, Take 1 tablet by mouth Daily., Disp: 30 tablet, Rfl: 2    lamoTRIgine (LaMICtal) 200 MG tablet, Take 1 tablet by mouth Daily., Disp: 30 tablet, Rfl: 2    QUEtiapine (SEROquel) 50 MG tablet, Take one half to one tablet nightly, Disp: 30 tablet, Rfl: 2     Medication Compliance:  compliance with medication regimen;     Assessment & Plan      Trauma Assessment:  Patient denies having been hit, slapped, kicked or otherwise physically hurt by others since last visit as well as having been forced to engage in any unwanted sexual acts since last visit.       Risk Assessment:  Patient adamantly and convincingly denies having SI/HI with or without intent, plans, or means. Patient denies having Hallucinations/Illusions and Delusions.  Patient  denies self-harming behaviors       Risk Level: History obtained from: patient and chart review.  Due to historical context and reported clinical markers, it appears patient meets criteria for LOW RISK to engage in self-harm or harm to others.  It is recommended Lattie be evaluated and assessed each contact for intent, plan, means and/or lethality each contact.  Protective factors: Supportive relationship with Cayden, positive response to uveitis treatment, and proactive approach to managing work stress.    Behavior Health Review Of Systems:  Psychiatric/Behavioral: Negative for agitation, behavioral problems,  decreased concentration, dysphoric mood, hallucinations, self-injury, sleep disturbance, suicidal ideas, negative for hyperactivity. Positive for sleep disturbance. The patient is nervous/anxious.    Pertinent items are noted in HPI.    MENTAL STATUS EXAM   General Appearance:  Cleanly groomed and dressed  Eye Contact:  Good eye contact  Attitude:  Cooperative  Motor Activity:  Other  Other Comment:  Video visit  Speech:  Normal rate, tone, volume  Language:  Spontaneous  Mood and affect:  Normal, pleasant  Hopelessness:  Denies  Loneliness: Denies  Thought Process:  Logical, goal-directed and linear  Associations/ Thought Content:  No delusions  Hallucinations:  None  Suicidal Ideations:  Not present  Homicidal Ideation:  Not present  Sensorium:  Alert  Orientation:  Person, place, time and situation  Immediate Recall, Recent, and Remote Memory:  Intact  Attention Span/ Concentration:  Good  Fund of Knowledge:  Appropriate for age and educational level  Insight:  Good  Judgement:  Good  Reliability:  Good  Impulse Control:  Good       Treatment plan status:  Active   Treatment plan progress: Progressing  Prognosis: Fair with Ongoing Treatment   Functional Status: Mild impairment   Disposition:   Patient does not appear to be malingering.     Visit Diagnosis/Plan    ICD-10-CM ICD-9-CM   1. Bipolar II disorder  F31.81 296.89   2. Trouble in sleeping  G47.9 780.50     Clinical Maneuvering:  All treatment options available at [facility] explored and documented.  The benefits of a healthy diet and exercise were discussed with patient, especially the positive effects they have on mental health. Patient encouraged to consider lifestyle modification regarding  diet and exercise patterns to maximize results of mental health treatment.  Reviewed previous available documentation  Reviewed most recent available labs     Justification for therapy: Patient continues to struggle with a chronic/pervasive  mental illness which continues to cause impairment in at least two important areas of functioning.  Patient appear(s) to maintain relative stability as compared to the  baseline measure.  Patient can reasonably be expected to continue to benefit from treatment and would likely be at increased risk for decompensation if treatment were stopped.  Patient endorses a positive benefit from therapy and appears to meet outpatient level of care. Patient expresses gratitude and reports Lattie had a positive experience today.  Assessment & Plan  Problems:  - Bipolar II Disorder  - Uveitis    Content of Therapy:  During the session, the patient discussed her recent experiences with uveitis and the subsequent treatment. He shared feelings of exhaustion and lack of motivation, despite stable mood and absence of irritability or agitation. The patient also explored his relationship dynamics with Cayden, noting feelings of disconnection due to conflicting schedules. Strategies for improving communication and intimacy were discussed. Additionally, he talked about work challenges, including holding employees accountable and managing stress. The patient expressed a desire to engage in grounding activities such as gardening and walking barefoot.    Clinical Impression:  The patient's mood appears stable, with no signs of depression or ilan. However, he reports persistent fatigue and low motivation, which may be attributed to recent stressors, including uveitis diagnosis and work-related challenges. His response to treatment for uveitis has been positive, with significant improvement in symptoms, vision has improved. The patient's relationship with Cayden is strained due to conflicting work schedules, but he is actively seeking ways to reconnect. Overall, he demonstrates insight into his condition and is motivated to implement strategies to improve his mental health and interpersonal relationships.    Therapeutic Intervention:  -  Cognitive-behavioral strategies to reframe negative thoughts and improve motivation.  - Mindfulness exercises to reduce stress and enhance grounding.  - Psychoeducation on the impact of stress and inflammation on uveitis.  - Communication techniques to enhance intimacy and resolve conflicts with Cayden.  Clinical Maneuvering:  All treatment options available at [facility] explored and documented.  The benefits of a healthy diet and exercise were discussed with patient, especially the positive effects they have on mental health. Patient encouraged to consider lifestyle modification regarding  diet and exercise patterns to maximize results of mental health treatment.  Reviewed previous available documentation  Reviewed most recent available labs   Plan:  - Monitor irritability levels and engage in grounding activities such as gardening and walking barefoot.  - Maintain open communication with Cayden about needs during periods of irritability.  - Avoid processed foods, trans fats, saturated fats, and excessive sugars to prevent inflammation.  - Schedule regular activities with Cayden to strengthen their relationship.  - Continue using steroid eye drops as prescribed and monitor vision change.     Justification for therapy: Patient continues to struggle with a chronic/pervasive mental illness which continues to cause impairment in at least two important areas of functioning.  Patient appear(s) to maintain relative stability as compared to the  baseline measure.  Patient can reasonably be expected to continue to benefit from treatment and would likely be at increased risk for decompensation if treatment were stopped.  Patient endorses a positive benefit from therapy and appears to meet outpatient level of care. Patient expresses gratitude and reports Lattice had a positive experience today.    Recommended Referrals: Psychiatrist/APRN and Medical Provider (PCP)    Follow Up:  Return in about 2-4 weeks,  or earlier if symptoms worsen or fail to improve for next follow up visit. [phone]  Goals for the session: Evaluate mood stability, assess motivation levels, and review progress in relationship dynamics and work-related stress management    Future Appointments         Provider Department Center    5/13/2025 4:00 PM Martina Boogie LCSW [facility]    5/27/2025 9:00 AM Negrita Mccabe APRN [facility]     5/28/2025 4:00 PM Martina Boogie LCSW [facility]    6/10/2025 4:00 PM Martina Boogie LCSW [facility]    6/23/2025 4:00 PM Martina Boogie LCSW [facility]    7/7/2025 4:00 PM Martina Boogie LCSW [facility]    7/21/2025 4:00 PM Martina Boogie LCSW [facility]            This document electronically signed by Martina Osei LCSW, [facility] April 29, 2025 07:17 EDT    Patient or patient representative verbalized consent for the use of Ambient Listening during the visit with  Martina Osei LCSW for chart documentation. 4/29/2025  16:10 EDT    At [facility], we believe that sharing information builds trust and better relationships. You are receiving this note because you are receiving care at [facility] or have recently visited. It is possible you will see health information before a provider has talked with you about it. This kind of information can be easy to misunderstand as it is a medical document. It is intended as bcyo-oq-ysst communication. It is written in medical language and may contain abbreviations or verbiage that are unfamiliar. It may appear blunt or direct. Medical documents are intended to carry relevant information, facts as  evident, and the clinical opinion of the practitioner.  To help you fully understand what it means for your health, we urge you to discuss this note with your provider

## 2025-05-13 ENCOUNTER — TELEMEDICINE (OUTPATIENT)
Dept: PSYCHIATRY | Facility: CLINIC | Age: 25
End: 2025-05-13
Payer: COMMERCIAL

## 2025-05-13 DIAGNOSIS — F31.81 BIPOLAR II DISORDER: Primary | ICD-10-CM

## 2025-05-13 NOTE — PROGRESS NOTES
[facility] [place] [place] [facility] 2  Outpatient Telehealth Progress Note   Patient Status:  Established  Name:  Jens Rogel  :  2000  Date of Service: 2025  Time In: 16:00 EDT  Time Out: 1655     HIPAA: You have chosen to receive care through a video visit today. This provider is located at home address in connection with the [facility] (through [facility]), [address] [place] [address] The Patient is seen remotely via telehealth at home ([address] [place] [address] [place] [id number]) using BitPass/Kobojo platforms and is in a secure environment for this session. The patient's condition being diagnosed/treated is appropriate for telemedicine. The provider identified herself and credentials GAMAL ARCHER.  The patient consent(s) to be seen remotely, and when consent is given, Jens understand(s) consent allows for patient identifiable information to be sent to a third party as needed. Jens can refuse to be seen remotely at any time. The electronic data is encrypted and password protected, and the patient has been advised of the potential risks to privacy, not withstanding such measures.  The patient has signed the video visit consent form.No technical issues occurred during this visit  The visit included audio and video interaction. .     Verified the patients identify using Name and date of birth. Patient states there are no changes the their insurance.     IDENTIFYING INFORMATION:  The patient is a 25 y.o. male who presents for  an outpatient follow-up appointment.      I, Jens FER Pebbles, hereby acknowledge that I have the right to discuss the assessment, potential risks, and benefits of any recommended treatment.      Chief Complaint: Patient reports problems with a Bipolar Mood Disorder.     Session Goal:  Patient with explore and process thoughts/feelings/coping skills to prevent deterioration of mood and need for a higher level of care.    Subjective     "  History of Present Illness  The patient is a 25-year-old male who presents for a virtual follow up therapy session of bipolar disorder.Patient presents on time, clean and casually dressed without evidence of intoxication, withdrawal, or perceptual disturbance. Patient arrived as: age appropriate.  Patient indicates Lattice is an open and willing participant in today's session.     He reports experiencing mood disturbances, characterized by a desire for solitude and aversion to social interactions. These symptoms have been present for approximately one week, more irritability. Reports excessive sleep, lethargy.He attributes these mood fluctuations to weather changes, noting an improvement in his mood when exposed to sunlight. He describes feeling \"great\" when engaging in outdoor activities like mowing the yard on faye days.   Eating patterns remain normal, and he maintains a positive relationship with his partner. He expresses dissatisfaction with his work environment, citing boredom and frustration due to lack of clear communication and excessive workload. Despite these challenges, he has successfully managed his work schedule within the required parameters. He also mentions a recent round table discussion with his boss and two partners, during which no negative feedback was received. Has been trying to hold on to the positive feedback he received although this is a struggle at times.    He is planning to attend a leadership conference in [place] from 06/09/2025 to 06/12/2025. He typically has Saturdays off, which he spends boating with friends during the summer. He does not report any gambling issues, briefly discussed shopping habits.  He expresses a desire to buy a car for cash that gets better gas mileage than his truck. .    He has been engaging in physical activities such as hiking and running, which he finds beneficial for his mood. He has been considering using an [employer] workout vanita for guidance on gym " exercises. He reports feeling better after running, attributing this to the alone time it provides. He has been experiencing increased sleepiness and low energy levels but does not report any feelings of guilt, shame, worthlessness, or hopelessness. He also does not report any decrease in concentration or appetite.  Pertinent Negatives:   - No feelings of guilt, shame, worthlessness, or hopelessness  - No decrease in concentration or appetite  - No spending sprees  Substance Use: alcohol use occasional  Recent labs:    Last Urine Toxicity           No data to display              Objective    Medical History: Areas Reviewed: The following portions of the patient's history were reviewed and updated as appropriate: allergies, current medications, past family history, past medical history, past social history, past surgical history, and problem list.    Vitals:  Weight:  No Weight Documented This Encounter  Height: No Height Documented This Encounter  BMI:  There is no height or weight on file to calculate BMI.  Education:  The benefits of a healthy diet and exercise were discussed with patient, especially the positive effects they have on mental health. Patient encouraged to consider lifestyle modification regarding  diet and exercise patterns to maximize results of mental health treatment.    Medication Review:    Current Outpatient Medications:     ARIPiprazole (Abilify) 5 MG tablet, Take 1 tablet by mouth Daily., Disp: 30 tablet, Rfl: 2    lamoTRIgine (LaMICtal) 200 MG tablet, Take 1 tablet by mouth Daily., Disp: 30 tablet, Rfl: 2    QUEtiapine (SEROquel) 50 MG tablet, Take one half to one tablet nightly, Disp: 30 tablet, Rfl: 2     Medication Compliance:  compliance with medication regimen;   Assessment & Plan      Risk Assessment for Suicide/Harm To Self/Others: Based on patient history, demographics and today's interview, Patient is considered to be at low risk for self harm/harm to self or others. Baseline risk  is increased compared to general population due to significant psychiatric comorbidity.     Protective factors include Insight into thought patterns, active engagement in therapeutic strategies, supportive relationship with partner.         Trauma Assessment:  Patient denies having been hit, slapped, kicked or otherwise physically hurt by others since last visit as well as having been forced to engage in any unwanted sexual acts since last visit.       Risk Assessment:  Patient adamantly and convincingly denies having SI/HI with or without intent, plans, or means. Patient denies having Hallucinations/Illusions and Delusions.  Patient  denies self-harming behaviors     Risk Level: History obtained from: patient and chart review.  Due to historical context and reported clinical markers, it appears patient meets criteria for LOW RISK to engage in self-harm or harm to others.  It is recommended Lattie be evaluated and assessed each contact for intent, plan, means and/or lethality each contact.      Behavior Health Review Of Systems:  Psychiatric/Behavioral: Negative for agitation, behavioral problems, decreased concentration, dysphoric mood, hallucinations, self-injury, sleep disturbance, suicidal ideas, negative for hyperactivity. Positive for depressed mood and stress. The patient is nervous/anxious.    Pertinent items are noted in HPI.    MENTAL STATUS EXAM   General Appearance:  Cleanly groomed and dressed  Eye Contact:  Good eye contact  Attitude:  Cooperative  Motor Activity:  Other  Other Comment:  Video visit  Speech:  Normal rate, tone, volume  Language:  Spontaneous  Mood and affect:  Frustrated  Hopelessness:  Denies  Loneliness: Denies  Thought Process:  Goal-directed, linear and logical  Associations/ Thought Content:  No delusions  Hallucinations:  None  Suicidal Ideations:  Not present  Homicidal Ideation:  Not present  Sensorium:  Alert  Orientation:  Person, place, time and situation  Immediate Recall,  Recent, and Remote Memory:  Intact  Attention Span/ Concentration:  Good  Fund of Knowledge:  Appropriate for age and educational level  Insight:  Good  Judgement:  Good  Reliability:  Good  Impulse Control:  Good     Treatment plan status:  Active   Treatment plan progress: Progressing  Prognosis: Fair with Ongoing Treatment   Functional Status: Mild impairment   Disposition:   Patient does not appear to be malingering.     Visit Diagnosis/Plan    ICD-10-CM ICD-9-CM   1. Bipolar II disorder  F31.81 296.89     Assessment & Plan  Problems:  - Bipolar disorder    Content of Therapy:  - Discussed mood disturbances, including a desire for solitude and aversion to social interactions, which have been present for approximately one week.  - Explored the impact of weather on mood, noting improvement with sunlight exposure.  - Addressed work-related stressors, including boredom, frustration, and lack of clear communication.  - Reviewed positive feedback received during a recent round table discussion at work.  - Discussed physical activities such as hiking and running, which are beneficial for mood.  - Considered the use of an [employer] workout vanita for gym exercises.  - Addressed increased sleepiness and low energy levels.    Clinical Impression:  The patient is experiencing mood disturbances consistent with bipolar disorder, characterized by a desire for solitude and aversion to social interactions. These symptoms have been present for approximately one week and appear to be influenced by weather changes. Despite these mood fluctuations, the patient maintains normal sleep and eating patterns and a positive relationship with his partner. Work-related stressors, including boredom and frustration due to lack of clear communication, contribute to his overall stress. However, he has successfully managed his work schedule and received positive feedback during a recent round table discussion. Physical activities such as hiking and  running have been beneficial for his mood. The patient reports increased sleepiness and low energy levels but does not experience feelings of guilt, shame, worthlessness, or hopelessness. Concentration and appetite remain unaffected.    Therapeutic Intervention:  - Cognitive-behavioral strategies to reframe negative thoughts related to work stress and mood disturbances.  - Encouragement to engage in regular physical activities, such as walking or running, to improve mood.  - Psychoeducation on the benefits of exercise and maintaining a balanced diet.  - Exploration of the potential use of an Trello vanita for gym exercises.    Plan:  - Incorporate regular exercise into the daily routine, such as walking or running for 10 to 15 minutes.  - Maintain a balanced diet and ensure adequate sleep.  - Monitor mood and energy levels, and report any persistent or worsening symptoms.    Justification for therapy: Patient continues to struggle with a chronic/pervasive mental illness which continues to cause impairment in at least two important areas of functioning.  Patient appear(s) to maintain relative stability as compared to the  baseline measure.  Patient can reasonably be expected to continue to benefit from treatment and would likely be at increased risk for decompensation if treatment were stopped.  Patient endorses a positive benefit from therapy and appears to meet outpatient level of care. Patient expresses gratitude and reports Lattice had a positive experience today.    Recommended Referrals: Psychiatrist/APRN and Medical Provider (PCP)    Follow Up:  Return in about 2 weeks, or earlier if symptoms worsen or fail to improve for next follow up visit. [phone]      Future Appointments         Provider Department Center    5/27/2025 9:00 AM (Arrive by 8:45 AM) Negrita Mccabe APRN [facility]     5/28/2025 4:00 PM Martina Boogie LCSW [facility]  [facility]    6/17/2025 3:00 PM Martina Boogie LCSW [facility]    6/23/2025 4:00 PM Martina Boogie LCSW [facility]    7/7/2025 4:00 PM Martina Boogie LCSW [facility]    7/21/2025 4:00 PM Martina Boogie LCSW [facility]            This document electronically signed by Martina Osei LCSW, LCADC May 13, 2025 16:25 EDT    Patient or patient representative verbalized consent for the use of Ambient Listening during the visit with  Martina Osei LCSW for chart documentation. 5/13/2025  16:01 EDT    At [facility], we believe that sharing information builds trust and better relationships. You are receiving this note because you are receiving care at [facility] or have recently visited. It is possible you will see health information before a provider has talked with you about it. This kind of information can be easy to misunderstand as it is a medical document. It is intended as vzoj-lo-fsex communication. It is written in medical language and may contain abbreviations or verbiage that are unfamiliar. It may appear blunt or direct. Medical documents are intended to carry relevant information, facts as evident, and the clinical opinion of the practitioner.  To help you fully understand what it means for your health, we urge you to discuss this note with your provider

## 2025-05-27 ENCOUNTER — OFFICE VISIT (OUTPATIENT)
Dept: PSYCHIATRY | Facility: CLINIC | Age: 25
End: 2025-05-27
Payer: COMMERCIAL

## 2025-05-27 VITALS
DIASTOLIC BLOOD PRESSURE: 77 MMHG | HEIGHT: 72 IN | HEART RATE: 69 BPM | BODY MASS INDEX: 26.22 KG/M2 | WEIGHT: 193.6 LBS | OXYGEN SATURATION: 98 % | SYSTOLIC BLOOD PRESSURE: 116 MMHG

## 2025-05-27 DIAGNOSIS — F31.81 BIPOLAR II DISORDER: Primary | ICD-10-CM

## 2025-05-27 DIAGNOSIS — G47.9 TROUBLE IN SLEEPING: ICD-10-CM

## 2025-05-27 DIAGNOSIS — F43.10 POST TRAUMATIC STRESS DISORDER (PTSD): ICD-10-CM

## 2025-05-27 PROCEDURE — 99214 OFFICE O/P EST MOD 30 MIN: CPT | Performed by: NURSE PRACTITIONER

## 2025-05-27 RX ORDER — LAMOTRIGINE 200 MG/1
200 TABLET ORAL DAILY
Qty: 30 TABLET | Refills: 1 | Status: SHIPPED | OUTPATIENT
Start: 2025-05-27

## 2025-05-27 RX ORDER — ARIPIPRAZOLE 10 MG/1
10 TABLET ORAL DAILY
Qty: 30 TABLET | Refills: 1 | Status: SHIPPED | OUTPATIENT
Start: 2025-05-27

## 2025-05-27 NOTE — PROGRESS NOTES
Subjective   Jens Rogel is a 25 y.o. male is here today for medication management follow-up.“ Patient or patient representative verbalized consent for the use of Ambient Listening during the visit with  ROBYN Whitaker for chart documentation. 5/27/2025  10:41 EST   Chief Complaint: recheck on mood and sleep    HPI:   History of Present Illness        The patient is a 25-year-old male who presents for evaluation of depression and PTSD.    He reports a lack of motivation and feelings of boredom, which he attributes to his work environment. He has been experiencing excessive sleep, averaging 14 hours per day, and describes a persistent lack of energy. Despite these symptoms, he does not endorse feelings of sadness or hopelessness. He expresses concern about the potential impact of his medication regimen on his mental state, as he takes Seroquel at night and Abilify and Lamictal in the morning. He has not attempted to discontinue Seroquel due to fear of insomnia. His sleep schedule is irregular, with instances of going to bed as early as 6:30 PM and waking up at 8:00 AM. He reports no manic episodes and rates his mood as consistently between 3 and 4 on a scale of 1 to 10, with 1 representing depression and 10 representing ilan. He has noticed weight gain and attempts to exercise when he feels energetic but often lacks motivation. He has previously tried Lexapro for anxiety, but it was ineffective.Body mass index is 26.25 kg/m².  Weight gain 5 lbs since last visit.  Denies thoughts of self harm.  Continues to work full time.      He continues to experience flashbacks related to past trauma, which are triggered by specific situations such as standing over a toilet or being touched by others. 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 He has since discontinued singing, even in Shinto settings. He  is considering the use of medical marijuana as an alternative to Seroquel, as he believes it improves his sleep without affecting his mood stabilizers. He currently uses  marijuana 2 to 3 times per week.    Social History:  - Works at [employer]  - Reports issues with his boss  - Parents are adopting two more children from foster care  - Brother is ; his children are being adopted by the patient's parents    Psychiatric History:  - Diagnosed with PTSD  - Previously prescribed Lexapro for anxiety, which was ineffective    Substance Use:  - Uses medical marijuana 2 to 3 times per week    Pertinent Negatives:  - Reports no feelings of sadness or hopelessness  - Reports no manic episodes    SOCIAL HISTORY  He uses medical marijuana 2 to 3 times a week.     The following portions of the patient's history were reviewed and updated as appropriate: allergies, current medications, past family history, past medical history, past social history, past surgical history and problem list.    Review of Systems   Constitutional:  Negative for activity change, appetite change and fatigue.   HENT: Negative.     Eyes:  Negative for visual disturbance.   Respiratory: Negative.     Cardiovascular: Negative.    Gastrointestinal:  Negative for nausea.   Endocrine: Negative.    Genitourinary: Negative.    Musculoskeletal:  Negative for arthralgias.   Skin: Negative.    Allergic/Immunologic: Negative.    Neurological:  Negative for dizziness, seizures and headaches.   Hematological: Negative.    Psychiatric/Behavioral:  Positive for sleep disturbance. Negative for agitation, behavioral problems, confusion, decreased concentration, dysphoric mood, hallucinations, self-injury and suicidal ideas. The patient is nervous/anxious. The patient is not hyperactive.      Reviewed copied data and there are no changes      Objective   Physical Exam  Vitals reviewed.   Constitutional:       Appearance: Normal appearance.   Musculoskeletal:       "Cervical back: Normal range of motion and neck supple.   Neurological:      General: No focal deficit present.      Mental Status: Jens is alert and oriented to person, place, and time.   Psychiatric:         Attention and Perception: Attention normal.         Mood and Affect: Mood normal.         Speech: Speech normal.         Behavior: Behavior normal. Behavior is cooperative.         Thought Content: Thought content normal.         Cognition and Memory: Cognition normal.         Judgment: Judgment normal.       Blood pressure 116/77, pulse 69, height 182.9 cm (72.01\"), weight 87.8 kg (193 lb 9.6 oz), SpO2 98%.    Medication List:   Current Outpatient Medications   Medication Sig Dispense Refill    ARIPiprazole (Abilify) 10 MG tablet Take 1 tablet by mouth Daily. 30 tablet 1    lamoTRIgine (LaMICtal) 200 MG tablet Take 1 tablet by mouth Daily. 30 tablet 1     No current facility-administered medications for this visit.     Reviewed copied data and there are no changes    Mental Status Exam:   Hygiene:   good  Cooperation:  Cooperative  Eye Contact:  Good  Psychomotor Behavior:  Appropriate  Affect:  Full range  Hopelessness: Denies  Speech:  Normal  Thought Process:  Goal directed  Thought Content:  Normal  Suicidal:  None  Homicidal:  None  Hallucinations:  None  Delusion:  None  Memory:  Intact  Orientation:  Person, Place, Time and Situation  Reliability:  good  Insight:  Good  Judgement:  Good  Impulse Control:  Good  Physical/Medical Issues:  No     Assessment & Plan   Problems Addressed this Visit    None  Visit Diagnoses         Bipolar II disorder    -  Primary    Relevant Medications    ARIPiprazole (Abilify) 10 MG tablet    lamoTRIgine (LaMICtal) 200 MG tablet      Trouble in sleeping        Relevant Medications    ARIPiprazole (Abilify) 10 MG tablet      Post traumatic stress disorder (PTSD)        Relevant Medications    ARIPiprazole (Abilify) 10 MG tablet          Diagnoses         Codes Comments    "   Bipolar II disorder    -  Primary ICD-10-CM: F31.81  ICD-9-CM: 296.89       Trouble in sleeping     ICD-10-CM: G47.9  ICD-9-CM: 780.50       Post traumatic stress disorder (PTSD)     ICD-10-CM: F43.10  ICD-9-CM: 309.81           Functionality: pt having moderate impairment in important areas of daily functioning.  Prognosis: Good dependent on medication/follow up and treatment plan compliance.    Assessment & Plan      Follow-up  The patient will follow up in 3 months.      Problems:  - Depression  - Post-traumatic stress disorder (PTSD)    Content of Therapy:  During the session, the patient discussed feeling unmotivated and experiencing excessive sleep, up to 14 hours a day. He mentioned feeling bored and lacking motivation at work, which has led to falling behind on tasks. The patient also expressed concerns about his boss's behavior towards him and his fear of not sleeping if he discontinues Seroquel. Additionally, he shared his experiences with flashbacks and panic attacks related to past trauma, and his use of medical marijuana to aid sleep. The patient reported a lack of interest in activities he once enjoyed, such as singing.    Clinical Impression:  The patient presents with symptoms of low energy, excessive sleep, and lack of motivation, though he does not report feelings of sadness or hopelessness. His mood appears stable without signs of ilan, but he feels consistently at a low energy level. The patient is gaining weight but remains within a healthy BMI range. He continues to experience PTSD-related flashbacks and panic attacks triggered by specific situations. The use of medical marijuana for sleep appears to be effective without interfering with his mood stabilizers.    Therapeutic Intervention:  - Discontinuation of Seroquel with the option to resume at half the dose if needed.  - Increase of Abilify dosage to 10 mg daily.  - Discussion on the potential addition of a small dose of antidepressant if  symptoms do not improve.  - Encouragement to engage in physical activities, such as running, to help manage symptoms.  - Consideration of medical marijuana for sleep management.    Plan:  - Discontinue Seroquel; resume at half the dose if necessary.  - Increase Abilify to 10 mg daily.  - Monitor symptoms and consider adding a small dose of antidepressant if no improvement.  - Engage in physical activities, such as running.  -he is considering medical marijuana for sleep  -continue therapy with LCSW    Follow-up:  - Follow-up appointment scheduled in 5 weeks on 07/09/2025 at 10:30 AM.    Notes & Risk Factors:  - PTSD-related flashbacks and panic attacks.  - Use of medical marijuana for sleep.  - Protective factors include stable mood and engagement in therapy.       Continuing efforts to promote the therapeutic alliance, address the patient's issues, and strengthen self awareness, insights, and coping skills.    Patient or patient representative verbalized consent for the use of Ambient Listening during the visit with  ROBYN Whitaker for chart documentation. 5/27/2025  10:18 EST   Patient is agreeable to call the Clinic with worsening symptoms.    Patient is aware to call 911 or go to the nearest ER should begin having SI/HI. rtc 12 weeks.  Sooner if needed               This document has been electronically signed by ROBYN Whitaker on   May 27, 2025 09:59 EDT.

## 2025-06-23 ENCOUNTER — TELEMEDICINE (OUTPATIENT)
Dept: PSYCHIATRY | Facility: CLINIC | Age: 25
End: 2025-06-23
Payer: COMMERCIAL

## 2025-06-23 DIAGNOSIS — F43.10 POST TRAUMATIC STRESS DISORDER (PTSD): ICD-10-CM

## 2025-06-23 DIAGNOSIS — F31.81 BIPOLAR II DISORDER: Primary | ICD-10-CM

## 2025-06-23 DIAGNOSIS — G47.9 TROUBLE IN SLEEPING: ICD-10-CM

## 2025-06-23 NOTE — TREATMENT PLAN
Multi-Disciplinary Problems (from Behavioral Health Treatment Plan)      Active Problems       Problem: Mood Instability  Start Date: 06/23/25      Problem Details: The patient self-scales this problem as a 7 with 10 being the worst.          Goal Priority Start Date Expected End Date End Date    Patient will achieve mood stability as evidenced by controlled behavior and a more deliberate thought process -- 06/23/25 12/22/25 --    Goal Details: Progress toward goal:  The patient self-scales their progress related to this goal as a 5 with 10 being the worst.        Goal Intervention Frequency Start Date End Date    Provide structure and focus to patient's thoughts and actions by establishing plans and routine. Each Visit 06/23/25 --    Intervention Details: Duration of treatment until remission of symptoms.        Goal Intervention Frequency Start Date End Date    Assist patient in setting responsible goals and limits in behavior. Each Visit 06/23/25 --    Intervention Details: Duration of treatment until discharged.                Problem: Trauma and Stressor Related Disorders  Start Date: 06/23/25      Problem Details: The patient self-scales this problem as a 8 with 10 being the worst.          Goal Priority Start Date Expected End Date End Date    Patient will process and move through trauma in a way that improves self regard and the patients ability to function optimally in the world around them. -- 06/23/25 12/22/25 --    Goal Details: Progress toward goal:  Not appropriate to rate progress toward goal since this is the initial treatment plan.        Goal Intervention Frequency Start Date End Date    Assist patient in identifying ways that trauma has negatively impacted their view of themselves and the world. Each Visit 06/23/25 --    Intervention Details: Duration of treatment until discharged.        Goal Intervention Frequency Start Date End Date    Process trauma in the context of the safe session  environment. Each Visit 06/23/25 --    Intervention Details: Duration of treatment until remission of symptoms.        Goal Intervention Frequency Start Date End Date    Develop a plan of behavior changes that will reduce the stress of the trauma. Each Visit 06/23/25 --    Intervention Details: Duration of treatment until remission of symptoms.                Problem: Vocational Stress  Start Date: 06/23/25      Problem Details: The patient self-scales this problem as a 7 with 10 being the worst.          Goal Priority Start Date Expected End Date End Date    Patient will develop a constructive action plan that will reduce specific areas of work stress. -- 06/23/25 12/22/25 --    Goal Details: Progress toward goal:  The patient self-scales their progress related to this goal as a 6 with 10 being the worst. A lot of work place stress        Goal Intervention Frequency Start Date End Date    Assist patient in identifying emotions and cognitive messages surrounding the work stress. Each Visit 06/23/25 --    Intervention Details: Duration of treatment until remission of symptoms.        Goal Intervention Frequency Start Date End Date    Reinforce realistic self appraisal of patient's successes and challenges at work. Each Visit 06/23/25 --    Intervention Details: Duration of treatment until remission of symptoms.                        Reviewed By       Martina Boogie LCSW 06/23/25 [id number]                     I have discussed and reviewed this treatment plan with the patient.

## 2025-06-23 NOTE — PROGRESS NOTES
[facility] 2  Outpatient Telehealth Progress Note   Patient Status:  Established  Name:  Jens Rogel  :  2000  Date of Service: 2025  Time In: 16:01 EDT  Time Out: 1655     HIPAA: You have chosen to receive care through a video visit today. This provider is located at home address in connection with the [facility] (through [facility]), [address] [place] [address] The Patient is seen remotely via telehealth at home ([address] [place] [address] [place] [address]) using Epic/Twillo platforms and is in a secure environment for this session. The patient's condition being diagnosed/treated is appropriate for telemedicine. The provider identified herself and credentials GAMAL ARCHER.  The patient consent(s) to be seen remotely, and when consent is given, Jens understand(s) consent allows for patient identifiable information to be sent to a third party as needed. Jens can refuse to be seen remotely at any time. The electronic data is encrypted and password protected, and the patient has been advised of the potential risks to privacy, not withstanding such measures.  You have chosen to receive care through a telehealth visit.  The patient has signed the video visit consent form.  The visit included audio and video interaction. No technical issues occurred during this visit.     Verified the patients identify using Name and date of birth. Patient states there are no changes the their insurance.     IDENTIFYING INFORMATION:  The patient is a 25 y.o. male who presents for  an outpatient follow-up appointment.      I, Jens Rogel, hereby acknowledge that I have the right to discuss the assessment, potential risks, and benefits of any recommended treatment.    Chief Complaint: Patient reports problems with PTSD anxiety and a Bipolar Mood Disorder.     Session Goal:  Patient with explore and process thoughts/feelings/coping skills to prevent  deterioration of mood and need for a higher level of care.    Subjective   History of Present Illness  Patient presents for a virtual follow up therapy session, on time, clean and casually dressed without evidence of intoxication, withdrawal, or perceptual disturbance. Patient arrived as: age appropriate.  Patient indicates Jens is an open and willing participant in today's session.         He reports experiencing fatigue, which he attributes to his work schedule that often extends late into the night, necessitating early morning awakenings. He shares some increase irritability lately- mostly regarding work. He expresses dissatisfaction with his current job, feeling overworked and underappreciated, leading to a desire to resign. Despite these challenges, he maintains a positive relationship with his supervisor and has received favorable feedback from his superiors. He has recently applied for a remote  position, which he believes will provide him with more flexibility and reduce his commuting expenses.He has been diagnosed with PTSD and is considering addressing the trauma. He reports feeling unsafe in public places, particularly around confrontational men, and experiences anxiety when dealing with difficult customers at work. He has been engaging in exposure therapy as part of his job but finds that the associated feelings persist. He feels safe at home and enjoys spending time in bed with his partner. He reports no specific triggers for his dissatisfaction with his job but notes that he often feels unfulfilled during the summer months.    He has been using marijuana as a sleep aid, which he finds effective and does not result in the same lack of motivation he experienced with Seroquel. He recalls an incident at a leadership conference in [place] where he experienced a panic attack due to the large crowd and chaotic environment, even though he typically does not have issues with crowds. He also  mentions a recent meal of a chicken salad sandwich.    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 He feels that his job leaves him with little free time, even on his days off, as he is often called in to work. He is currently enrolled in a statistics course, to Start in July.     Social History:  - Works as a   - Applied for a remote  position  - Feels overworked and underappreciated  - Enjoys spending time at home with his partner     Substance Use: occasional THC use for sleep.  Recent labs:    Last Urine Toxicity           No data to display              Objective    Medical History: Areas Reviewed: The following portions of the patient's history were reviewed and updated as appropriate: allergies, current medications, past family history, past medical history, past social history, past surgical history, and problem list.    Vitals:  Weight:  No Weight Documented This Encounter  Height: No Height Documented This Encounter  BMI:  There is no height or weight on file to calculate BMI.  Education:  The benefits of a healthy diet and exercise were discussed with patient, especially the positive effects they have on mental health. Patient encouraged to consider lifestyle modification regarding  diet and exercise patterns to maximize results of mental health treatment.    Medication Review:    Current Outpatient Medications:     ARIPiprazole (Abilify) 10 MG tablet, Take 1 tablet by mouth Daily., Disp: 30 tablet, Rfl: 1    lamoTRIgine (LaMICtal) 200 MG tablet, Take 1 tablet by mouth Daily., Disp: 30 tablet, Rfl: 1     Medication Compliance:  compliance with medication regimen;     Assessment & Plan        Trauma Assessment:  Patient denies having been hit, slapped, kicked or otherwise physically hurt by others since last visit as well as having been forced to  engage in any unwanted sexual acts since last visit.       Risk Assessment:  Patient adamantly and convincingly denies having SI/HI with or without intent, plans, or means. Patient denies having Hallucinations/Illusions and Delusions.  Patient  denies self-harming behaviors      Risk Level: History obtained from: patient and chart review.  Due to historical context and reported clinical markers, it appears patient meets criteria for LOW RISK to engage in self-harm or harm to others. Baseline risk is increased compared to general population due to significant psychiatric comorbidity.  It is recommended Lattie be evaluated and assessed each contact for intent, plan, means and/or lethality each contact.      Behavior Health Review Of Systems:  Psychiatric/Behavioral: Negative for agitation, behavioral problems, decreased concentration, dysphoric mood, hallucinations, self-injury, sleep disturbance, suicidal ideas, negative for hyperactivity. Positive for stress. The patient is nervous/anxious.    Pertinent items are noted in HPI.    MENTAL STATUS EXAM   General Appearance:  Cleanly groomed and dressed  Eye Contact:  Good eye contact  Attitude:  Cooperative  Motor Activity:  Other  Other Comment:  Video visit  Speech:  Normal rate, tone, volume  Language:  Spontaneous  Mood and affect:  Frustrated  Hopelessness:  Denies  Loneliness: Denies  Thought Process:  Goal-directed, linear and logical  Associations/ Thought Content:  No delusions  Hallucinations:  None  Suicidal Ideations:  Not present  Homicidal Ideation:  Not present  Sensorium:  Alert  Orientation:  Person, place, time and situation  Immediate Recall, Recent, and Remote Memory:  Intact  Attention Span/ Concentration:  Good  Fund of Knowledge:  Appropriate for age and educational level  Insight:  Good  Judgement:  Good  Reliability:  Good  Impulse Control:  Good     Treatment plan status:  Active and Quarterly Review 06/24/25   Treatment plan progress:  Ongoing  Prognosis: Fair with Ongoing Treatment   Functional Status: Mild impairment   Disposition:   Patient does not appear to be malingering.      Visit Diagnosis/Plan    ICD-10-CM ICD-9-CM   1. Bipolar II disorder  F31.81 296.89   2. Post traumatic stress disorder (PTSD)  F43.10 309.81   3. Trouble in sleeping  G47.9 780.50     Assessment & Plan  Problems:  - Post-traumatic stress disorder (PTSD)    Content of Therapy:  During the session, the patient discussed his recent experiences with work-related stress, including covering shifts for absent supervisors and dealing with confrontational colleagues. He expressed feelings of frustration and a desire to quit his job. The patient also shared his recent application for a remote  position, which he hopes will provide more free time and reduce stress. Additionally, he recounted a panic attack experienced during a crowded and chaotic leadership conference in [place]. The patient explored his feelings of anxiety in public places, particularly around confrontational men, and discussed his use of marijuana for sleep instead of Seroquel.    Clinical Impression:  The patient exhibits significant improvement in sleep and motivation since switching from Seroquel to marijuana, although he does not use it every night. He continues to experience anxiety in crowded and chaotic environments, which has led to panic attacks. He feels unsafe in public places, particularly around confrontational men. Despite these challenges, the patient demonstrates resilience and a proactive approach to managing his mental health, including applying for a less stressful job and considering further education.    Therapeutic Intervention:  Discussed progress in treatment and any needs/concerns.   Encouraged the patient to discuss/vent their feelings, frustrations, and fears concerning their ongoing issues and validated their feelings.    Assisted patient in processing above session  content; acknowledged and normalized patient’s thoughts, feelings, and concerns.     Specific therapeutic techniques discussed include visualization exercises to enhance feelings of safety, bilateral music for anxiety reduction, and exploration of trauma processing techniques to reduce the amygdala's response. The patient was guided through visualization exercises to identify safe spots and manage anxiety related to confrontational situations.    Clinical Maneuvering:  The benefits of a healthy diet and exercise were discussed with patient, especially the positive effects they have on mental health. Patient encouraged to consider lifestyle modification regarding  diet and exercise patterns to maximize results of mental health treatment.  Reviewed previous available documentation  Reviewed most recent available labs     Plan:  - Continue using marijuana as needed for sleep.  - Explore different therapeutic techniques to process trauma and reduce the amygdala's response.  - Stay hydrated and rest adequately.  - Apply visualization and bilateral music techniques to manage anxiety.    Justification for therapy: Patient continues to struggle with a chronic/pervasive mental illness which continues to cause impairment in at least two important areas of functioning.  Patient appear(s) to maintain relative stability as compared to the  baseline measure.  Patient can reasonably be expected to continue to benefit from treatment and would likely be at increased risk for decompensation if treatment were stopped.  Patient endorses a positive benefit from therapy and appears to meet outpatient level of care. Patient expresses gratitude and reports [place] had a positive experience today.      Recommended Referrals: Psychiatrist/APRN and Medical Provider (PCP)    Follow Up:  Return in about 2   weeks, or earlier if symptoms worsen or fail to improve for next follow up visit. [phone]      Future Appointments         Provider  Department Center    7/7/2025 4:00 PM Martina Boogie LCSW [facility]    7/9/2025 10:30 AM (Arrive by 10:15 AM) Negrita Mccabe APRN [facility]     7/21/2025 4:00 PM Martina Boogie LCSW [facility]            This document electronically signed by Martina Osei LCSW, [facility] June 24, 2025 10:41 EDT    Patient or patient representative verbalized consent for the use of Ambient Listening during the visit with  Martina Osei LCSW for chart documentation. 6/24/2025  16:01 EDT    At [facility], we believe that sharing information builds trust and better relationships. You are receiving this note because you are receiving care at [facility] or have recently visited. It is possible you will see health information before a provider has talked with you about it. This kind of information can be easy to misunderstand as it is a medical document. It is intended as [id number] communication. It is written in medical language and may contain abbreviations or verbiage that are unfamiliar. It may appear blunt or direct. Medical documents are intended to carry relevant information, facts as evident, and the clinical opinion of the practitioner.  To help you fully understand what it means for your health, we urge you to discuss this note with your provider

## 2025-07-07 ENCOUNTER — TELEMEDICINE (OUTPATIENT)
Dept: PSYCHIATRY | Facility: CLINIC | Age: 25
End: 2025-07-07
Payer: COMMERCIAL

## 2025-07-07 DIAGNOSIS — F43.10 POST TRAUMATIC STRESS DISORDER (PTSD): ICD-10-CM

## 2025-07-07 DIAGNOSIS — F31.81 BIPOLAR II DISORDER: Primary | ICD-10-CM

## 2025-07-07 NOTE — PROGRESS NOTES
[facility]  Outpatient Telehealth Progress Note   Patient Status:  Established  Name:  Jens Rogel  :  2000  Date of Service: 2025  Time In: 16:01 EDT  Time Out: 1659     HIPAA: You have chosen to receive care through a video visit today. This provider is located at home address in connection with the [facility] (through [facility]), [address] [place] [address] The Patient is seen remotely via telehealth at home ([address] [place] [address] [place] [address]) using [email or web address] platforms and is in a secure environment for this session. The patient's condition being diagnosed/treated is appropriate for telemedicine. The provider identified herself and credentials GAMAL ARCHER.  The patient consent(s) to be seen remotely, and when consent is given, Jens understand(s) consent allows for patient identifiable information to be sent to a third party as needed. Jens can refuse to be seen remotely at any time. The electronic data is encrypted and password protected, and the patient has been advised of the potential risks to privacy, not withstanding such measures.    The patient has signed the video visit consent form.  The visit included audio and video interaction. No technical issues occurred during this visit.     Verified the patients identify using Name and date of birth. Patient states there are no changes the their insurance.     IDENTIFYING INFORMATION:  The patient is a 25 y.o. male who presents for  an outpatient follow-up appointment.      I, Jens FER Pebbles, hereby acknowledge that I have the right to discuss the assessment, potential risks, and benefits of any recommended treatment.  Chief Complaint: Patient reports problems with anxiety, PTSD, and a Bipolar Mood Disorder.   Session Goal:  Patient with explore and process thoughts/feelings/coping skills to prevent deterioration of mood and need for a higher level of  care.    Subjective   History of Present Illness  The patient presents via virtual follow up psychotherapy session Bipolar disorder, anxiety &  PTSD. Patient presents for a virtual follow up therapy session, on time, clean and casually dressed without evidence of intoxication, withdrawal, or perceptual disturbance. Patient arrived as: age appropriate.  Patient indicates Jens is an open and willing participant in today's session.       He is experiencing significant stress due to his work environment, which he describes as immature and uncooperative. This has led to feelings of physical and mental exhaustion. 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 He constantly fears getting in trouble at work, worrying about not answering his phone or not completing tasks quickly enough. He reports feeling safe at work and does not feel like people stare at him or even know who he is, which he likes because it is [place] in [place] hometown. (He shares about past trauma, court case and sentencing with ongoing symptoms of PTSD (which he is wanting to address so he can not be afraid).  He reports no complaints about him from other employees but he is still quiet anxious about this.    He plans to spend time increasing therapy session and exploring ways to manage the increasing emotional demands and questions if he can manage work stress and the constant stress and worry of what can happen. Shares that in the past spending time outdoors, engaging in activities such as hiking and swimming, is therapeutic. He also plans to spend time at home, working on projects and reconnecting with friends to help with isolation. He has been writing in a mindfulness journal weekly and reports no financial stress. He is  sleeping well, although it takes him some time to fall asleep. He has been working daily since 06/28/2025 and left work early today to rest.  Mentions that he is still terrified of fireworks    PCL-5 =Post Traumatic Stress Disorder Tool indicating PTSD    Somatic Symptoms:  fatigue    Substance Use: THC for sleep,   Recent labs:    Last Urine Toxicity           No data to display              Objective    Medical History: Areas Reviewed: The following portions of the patient's history were reviewed and updated as appropriate: allergies, current medications, past family history, past medical history, past social history, past surgical history, and problem list.    Vitals:  Weight:  No Weight Documented This Encounter  Height: No Height Documented This Encounter  BMI:  There is no height or weight on file to calculate BMI.  Education:  The benefits of a healthy diet and exercise were discussed with patient, especially the positive effects they have on mental health. Patient encouraged to consider lifestyle modification regarding  diet and exercise patterns to maximize results of mental health treatment.    Medication Review:    Current Outpatient Medications:     ARIPiprazole (Abilify) 10 MG tablet, Take 1 tablet by mouth Daily., Disp: 30 tablet, Rfl: 1    lamoTRIgine (LaMICtal) 200 MG tablet, Take 1 tablet by mouth Daily., Disp: 30 tablet, Rfl: 1     Medication Compliance:  non-compliance with medication regimen because running late for work. He missed one day of medication due to a busy work schedule.    Assessment & Plan      Trauma Assessment:  Patient denies having been hit, slapped, kicked or otherwise physically hurt by others since last visit as well as having been forced to engage in any unwanted sexual acts since last visit.       Risk Assessment:  Patient adamantly and convincingly denies having SI/HI with or without intent, plans, or means. Patient denies having Hallucinations/Illusions and Delusions.   Patient  denies self-harming behaviors    No immediate risk factors for harm to self or others identified.  - Protective factors include supportive relationships with friends and partner, engagement in self-care activities, and willingness to seek help    Risk Level: History obtained from: patient and chart review.  Due to historical context and reported clinical markers, it appears patient meets criteria for LOW RISK to engage in self-harm or harm to others. Baseline risk is increased compared to general population due to significant psychiatric comorbidity. It is recommended Lattie be evaluated and assessed each contact for intent, plan, means and/or lethality each contact.    Behavior Health Review Of Systems:  Psychiatric/Behavioral: Negative for agitation, behavioral problems, decreased concentration, dysphoric mood, hallucinations, self-injury, sleep disturbance, suicidal ideas, negative for hyperactivity. Positive for decreased concentration, agitation, and  stress. The patient is nervous/anxious.    Pertinent items are noted in HPI.    MENTAL STATUS EXAM   General Appearance:  Cleanly groomed and dressed  Eye Contact:  Good eye contact  Attitude:  Cooperative  Motor Activity:  Normal gait, posture  Muscle Strength:  Normal  Speech:  Normal rate, tone, volume  Mood and affect:  Anxious and frustrated  Hopelessness:  Denies  Loneliness: Denies  Thought Process:  Goal-directed and linear  Associations/ Thought Content:  No delusions  Hallucinations:  None  Suicidal Ideations:  Not present  Homicidal Ideation:  Not present  Sensorium:  Alert  Orientation:  Person, place, time and situation  Immediate Recall, Recent, and Remote Memory:  Intact  Attention Span/ Concentration:  Good  Fund of Knowledge:  Appropriate for age and educational level  Insight:  Good  Judgement:  Good  Reliability:  Good  Impulse Control:  Good     Treatment plan status:  Active   Treatment plan progress: Ongoing  Prognosis: Fair with  Ongoing Treatment   Functional Status: Moderate impairment   Disposition:   Patient does not appear to be malingering.     Visit Diagnosis/Plan    ICD-10-CM ICD-9-CM   1. Bipolar II disorder  F31.81 296.89   2. Post traumatic stress disorder (PTSD)  F43.10 309.81     Assessment & Plan  Problems:  - Post-traumatic stress disorder (PTSD), Bi Polar II Disorder    Content of Therapy:  - Discussed the patient's ongoing stress and anxiety related to work, including issues with staff management and feeling overwhelmed.  - Explored the impact of past traumatic experiences, including a court case and work-related stressors.  - Addressed the patient's need for a break and plans for self-care during the leave.  - Discussed the patient's relationship dynamics and the impact of medication on her partner's mental health.  - Emphasized the importance of setting boundaries and prioritizing self-care.    Clinical Impression:  The patient presents with significant symptoms of PTSD, including repeated disturbing memories, strong negative beliefs about herself, and avoidance of reminders of the traumatic experience. She reports moderate to extreme levels of distress related to these symptoms. The patient also experiences work-related stress and anxiety, exacerbated by staff management issues and past traumatic experiences. Despite these challenges, she demonstrates insight into her condition and a willingness to engage in self-care activities.    Therapeutic Intervention: Encouraged the patient to discuss/vent their feelings, frustrations, and fears concerning their ongoing issues and validated their feelings. Assisted patient in processing above session content; acknowledged and normalized patient’s thoughts, feelings, and concerns. Allowed patient to freely discuss issues without interruption or judgment. .  - Cognitive reframing to address irrational thoughts and fears related to work.  - Encouraged daily exercise, meditation, and  outdoor activities to manage stress.  - Discussed the importance of setting boundaries and prioritizing self-care.  - Recommended listening to anxiety-reducing music.  - Discuss with psychiatric nurse practitioner ROBBY for further processing.  - Advised to sign a release form during the appointment with Negrita.    Clinical Maneuvering:  All treatment options available at [facility] explored and documented.  The benefits of a healthy diet and exercise were discussed with patient, especially the positive effects they have on mental health. Patient encouraged to consider lifestyle modification regarding  diet and exercise patterns to maximize results of mental health treatment.  Reviewed previous available documentation  Reviewed most recent available labs     Plan:  - Engage in daily exercise and meditation.  - Listen to music that reduces anxiety.  - Sign a release form during the appointment with Negrita.  - Bring insurance card to the next appointment.    Justification for therapy: Patient continues to struggle with a chronic/pervasive mental illness which continues to cause impairment in at least two important areas of functioning.  Patient appear(s) to maintain relative stability as compared to the  baseline measure.  Patient can reasonably be expected to continue to benefit from treatment and would likely be at increased risk for decompensation if treatment were stopped.  Patient endorses a positive benefit from therapy and appears to meet outpatient level of care. Patient expresses gratitude and reports Jens had a positive experience today.    Recommended Referrals: Psychiatrist/APRN and Medical Provider (PCP)    Follow Up:  Return in about 2 weeks, or earlier if symptoms worsen or fail to improve for next follow up visit. [phone]        Future Appointments         Provider Department Center    7/9/2025 10:30 AM (Arrive by 10:15 AM) Negrita Mccabe, ROBYN [facility]  [facility]     7/21/2025 4:00 PM Martina Boogie LCSW [facility]    7/29/2025 12:00 PM Martina Boogie LCSW [facility]    8/6/2025 12:00 PM Martina Boogie LCSW [facility]    8/11/2025 4:00 PM Martina Boogie LCSW [facility]    8/18/2025 4:00 PM Martina Boogie LCSW [facility]    8/25/2025 4:00 PM Martina Boogie LCSW [facility]    9/8/2025 4:00 PM Martina Boogie LCSW [facility]            This document electronically signed by Martina Osei LCSW, [facility] July 8, 2025 10:15 EDT    Patient or patient representative verbalized consent for the use of Ambient Listening during the visit with  Martina Osei LCSW for chart documentation. 7/8/2025  16:01 EDT    At [facility], we believe that sharing information builds trust and better relationships. You are receiving this note because you are receiving care at [facility] or have recently visited. It is possible you will see health information before a provider has talked with you about it. This kind of information can be easy to misunderstand as it is a medical document. It is intended as [id number] communication. It is written in medical language and may contain abbreviations or verbiage that are unfamiliar. It may appear blunt or direct. Medical documents are intended to carry relevant information, facts as evident, and the clinical opinion of the practitioner.  To help you fully understand what it means for your health, we urge you to discuss this note with your provider

## 2025-07-10 ENCOUNTER — OFFICE VISIT (OUTPATIENT)
Dept: PSYCHIATRY | Facility: CLINIC | Age: 25
End: 2025-07-10
Payer: COMMERCIAL

## 2025-07-10 VITALS
HEIGHT: 72 IN | OXYGEN SATURATION: 98 % | HEART RATE: 73 BPM | SYSTOLIC BLOOD PRESSURE: 116 MMHG | DIASTOLIC BLOOD PRESSURE: 75 MMHG | BODY MASS INDEX: 27.12 KG/M2 | WEIGHT: 200.2 LBS

## 2025-07-10 DIAGNOSIS — F31.81 BIPOLAR II DISORDER: Primary | ICD-10-CM

## 2025-07-10 DIAGNOSIS — F43.10 POST TRAUMATIC STRESS DISORDER (PTSD): ICD-10-CM

## 2025-07-10 DIAGNOSIS — G47.9 TROUBLE IN SLEEPING: ICD-10-CM

## 2025-07-10 PROCEDURE — 99214 OFFICE O/P EST MOD 30 MIN: CPT | Performed by: NURSE PRACTITIONER

## 2025-07-10 RX ORDER — ARIPIPRAZOLE 10 MG/1
10 TABLET ORAL DAILY
Qty: 30 TABLET | Refills: 1 | Status: SHIPPED | OUTPATIENT
Start: 2025-07-10

## 2025-07-10 RX ORDER — LAMOTRIGINE 200 MG/1
200 TABLET ORAL DAILY
Qty: 30 TABLET | Refills: 1 | Status: SHIPPED | OUTPATIENT
Start: 2025-07-10

## 2025-07-10 NOTE — PROGRESS NOTES
Subjective   Jens Rogel is a 25 y.o. male is here today for medication management follow-up.“ Patient or patient representative verbalized consent for the use of Ambient Listening during the visit with  ROBYN Whitaker for chart documentation. 7/10/2025  10:41 EST   Chief Complaint: recheck on mood and sleep    HPI:   History of Present Illness            The patient is a 25-year-old male who presents for evaluation of work-related stress.    He reports feeling overwhelmed and exhausted due to his work commitments, which have been ongoing for the past 2 years. He describes a constant barrage of text messages and an inability to disconnect from work even on his days off. 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 His financial obligations, particularly a truck payment, are a significant source of stress. He is not experiencing any suicidal ideation. He believes his current situation is exacerbating his anxiety and depression. He is considering a 3-week leave of absence from work, with the possibility of extending it to 4 weeks. Going to try and not take it but says the stress is mounting.  He denies any ilan.  Body mass index is 27.15 kg/m². .weight gain 7 lbs since last visit.  No medical stressors.  No negative side effects to meds.      Social History:  - Reports financial stress due to truck payment  - Considering adoption in the future  - Exploring other job opportunities    Pertinent Negatives:  - No suicidal ideation  - No current manic episodes     The following portions of the patient's history were reviewed and updated as appropriate: allergies, current medications, past family history, past medical history, past social history, past surgical history and problem list.    Review of Systems   Constitutional:  Negative for activity change, appetite change and fatigue.   HENT: Negative.     Eyes:  Negative for visual disturbance.  "  Respiratory: Negative.     Cardiovascular: Negative.    Gastrointestinal:  Negative for nausea.   Endocrine: Negative.    Genitourinary: Negative.    Musculoskeletal:  Negative for arthralgias.   Skin: Negative.    Allergic/Immunologic: Negative.    Neurological:  Negative for dizziness, seizures and headaches.   Hematological: Negative.    Psychiatric/Behavioral:  Positive for sleep disturbance. Negative for agitation, behavioral problems, confusion, decreased concentration, dysphoric mood, hallucinations, self-injury and suicidal ideas. The patient is nervous/anxious. The patient is not hyperactive.      Reviewed copied data and there are no changes      Objective   Physical Exam  Vitals reviewed.   Constitutional:       Appearance: Normal appearance.   Musculoskeletal:      Cervical back: Normal range of motion and neck supple.   Neurological:      General: No focal deficit present.      Mental Status: Lattie is alert and oriented to person, place, and time.   Psychiatric:         Attention and Perception: Attention normal.         Mood and Affect: Mood normal.         Speech: Speech normal.         Behavior: Behavior normal. Behavior is cooperative.         Thought Content: Thought content normal.         Cognition and Memory: Cognition normal.         Judgment: Judgment normal.       Blood pressure 116/75, pulse 73, height 182.9 cm (72.01\"), weight 90.8 kg (200 lb 3.2 oz), SpO2 98%.    Medication List:   Current Outpatient Medications   Medication Sig Dispense Refill    ARIPiprazole (Abilify) 10 MG tablet Take 1 tablet by mouth Daily. 30 tablet 1    lamoTRIgine (LaMICtal) 200 MG tablet Take 1 tablet by mouth Daily. 30 tablet 1     No current facility-administered medications for this visit.     Reviewed copied data and there are no changes    Mental Status Exam:   Hygiene:   good  Cooperation:  Cooperative  Eye Contact:  Good  Psychomotor Behavior:  Appropriate  Affect:  Full range  Hopelessness: " Denies  Speech:  Normal  Thought Process:  Goal directed  Thought Content:  Normal  Suicidal:  None  Homicidal:  None  Hallucinations:  None  Delusion:  None  Memory:  Intact  Orientation:  Person, Place, Time and Situation  Reliability:  good  Insight:  Good  Judgement:  Good  Impulse Control:  Good  Physical/Medical Issues:  No     Assessment & Plan   Problems Addressed this Visit    None  Visit Diagnoses         Bipolar II disorder    -  Primary    Relevant Medications    lamoTRIgine (LaMICtal) 200 MG tablet    ARIPiprazole (Abilify) 10 MG tablet      Post traumatic stress disorder (PTSD)        Relevant Medications    ARIPiprazole (Abilify) 10 MG tablet      Trouble in sleeping        Relevant Medications    ARIPiprazole (Abilify) 10 MG tablet          Diagnoses         Codes Comments      Bipolar II disorder    -  Primary ICD-10-CM: F31.81  ICD-9-CM: 296.89       Post traumatic stress disorder (PTSD)     ICD-10-CM: F43.10  ICD-9-CM: 309.81       Trouble in sleeping     ICD-10-CM: G47.9  ICD-9-CM: 780.50           Functionality: pt having moderate impairment in important areas of daily functioning.  Prognosis: Good dependent on medication/follow up and treatment plan compliance.    Assessment & Plan      Content of Therapy:  During the session, the patient expressed feeling overwhelmed and burnt out due to work-related stress. He described a constant need to be available for work, even on his days off, which has led to significant anxiety and feelings of being unable to enjoy personal time. The patient discussed the impact of his job on his mental health, including increased anxiety and depression. He also mentioned considering a job change due to the stress and the financial implications of his current role.    Clinical Impression:  The patient is experiencing significant work-related stress, leading to increased anxiety and depression. Despite these challenges, he reports that his current medications, Lamictal  and Abilify, are effectively managing his ilan. There are no reported thoughts of self-harm.     Therapeutic Intervention:  The session focused on validating the patient's feelings of stress and burnout, exploring the impact of his job on his mental health, and discussing potential solutions, including taking a leave of absence. The clinician provided support and guidance on managing work-related stress and encouraged the patient to consider his options for reducing stress, including potentially changing jobs.    Plan:  -   - He is going to try to continue with work and let me know if the situation worsens and he feels like he needs a break.  Continue Lamictal for mood stabilization and continue the Abilify for depression and mood stabilization.  Refills submitted    Follow-up:  A follow-up appointment has been scheduled for 08/07/2025     Notes & Risk Factors:  - No thoughts of self-harm reported.  - Protective factors include ongoing weekly appointments with another clinician.       Continuing efforts to promote the therapeutic alliance, address the patient's issues, and strengthen self awareness, insights, and coping skills.    Patient or patient representative verbalized consent for the use of Ambient Listening during the visit with  ROBYN Whitaker for chart documentation. 7/10/2025  10:18 EST   Patient is agreeable to call the Clinic with worsening symptoms.    Patient is aware to call 911 or go to the nearest ER should begin having SI/HI. rtc 6 weeks.  Sooner if needed               This document has been electronically signed by ROBYN Whitaker on   July 10, 2025 15:39 EDT.

## 2025-07-21 ENCOUNTER — TELEPHONE (OUTPATIENT)
Dept: FAMILY MEDICINE CLINIC | Facility: CLINIC | Age: 25
End: 2025-07-21
Payer: COMMERCIAL

## 2025-07-21 ENCOUNTER — TELEMEDICINE (OUTPATIENT)
Dept: PSYCHIATRY | Facility: CLINIC | Age: 25
End: 2025-07-21
Payer: COMMERCIAL

## 2025-07-21 DIAGNOSIS — F31.81 BIPOLAR II DISORDER: Primary | ICD-10-CM

## 2025-07-21 DIAGNOSIS — F43.10 POST TRAUMATIC STRESS DISORDER (PTSD): ICD-10-CM

## 2025-07-21 PROCEDURE — 90837 PSYTX W PT 60 MINUTES: CPT | Performed by: SOCIAL WORKER

## 2025-07-21 NOTE — PROGRESS NOTES
[facility]  Outpatient Telehealth Progress Note   Patient Status:  Established  Name:  Jens Rogel  :  2000  Date of Service: 2025  Time In: 16:02 EDT  Time Out: 1657     HIPAA: You have chosen to receive care through a video visit today. This provider is located at home address in connection with the [facility] (through [facility]), [address] [place] [address] The Patient is seen remotely via telehealth at home ([address] [place] [address] [place] [address]) using [employer] platforms and is in a secure environment for this session. The patient's condition being diagnosed/treated is appropriate for telemedicine. The provider identified herself and credentials GAMAL ARCHER.  The patient consent(s) to be seen remotely, and when consent is given, Jens understand(s) consent allows for patient identifiable information to be sent to a third party as needed. Jens can refuse to be seen remotely at any time. The electronic data is encrypted and password protected, and the patient has been advised of the potential risks to privacy, not withstanding such measures.    The patient has signed the video visit consent form.  The visit included audio and video interaction. No technical issues occurred during this visit.     Verified the patients identify using Name and date of birth. Patient states there are no changes the their insurance.     IDENTIFYING INFORMATION:  The patient is a 25 y.o. male who presents for  an outpatient follow-up appointment.      I, Jens FER Pebbles, hereby acknowledge that I have the right to discuss the assessment, potential risks, and benefits of any recommended treatment.      Chief Complaint: Patient reports problems with anxiety and a Bipolar Mood Disorder.     Session Goal:  Patient with explore and process thoughts/feelings/coping skills to prevent deterioration of mood and need for a higher level of  "care.    Subjective   History of Present Illness  The patient presents via virtual visit for evaluation of mental health issues.   He reports feeling well and hopeful after a productive day at work,  and his upcoming 3-week leave.to address his mental health. He plans to use this time to rest and recharge. 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 He hopes that this will reduce his stress levels as it will eliminate the need for daily commuting. He expresses excitement about the opportunity to learn from other managers and improve his skills. He feels valued by his superior, Nayla, who has been supportive of his mental health needs. He anticipates that his upcoming break will help reduce his stress levels and improve his overall well-being. He is not concerned about returning to work in [place], as he has had sufficient time away from it- and the fear of people talking about him regarding his past trauma, testifying and court.     He is looking forward to taking care of himself during his 3-week break, which he feels is necessary after 6 months of continuous work. 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 Shares insight into being able to \"push through\" challenging times which he can do for a while only to fall apart later. Recognizing the need to develop better skills to not try to do it all-which is his pattern at work which has lead to worsening of his mood, ability to tolerate everyday stressors, decreased sense of happiness, and overall depression and anxiety.  He enjoys traveling and finds it therapeutic.   He recalls a negative experience during a trip to [facility] due to the chaotic environment, which triggered a " panic attack. He found solace in locking himself in the bathroom for 15 minutes during these episodes about 5 of them. He is able to discuss how the PTSD symptoms manifested at that time. Despite the stressful environment, he was able to sleep well in [place].      Substance Use:  He has been using marijuana once a day at night, which he finds helpful for sleep. Without any sleep aid, he sleeps for about 4 hours, but with medication, he can sleep for up to 14 hours. Currently, he goes to bed at 9:00 PM and wakes up between 6:00 AM and 8:00 AM, feeling rested.    Last Urine Toxicity           No data to display              Objective    Medical History: Areas Reviewed: The following portions of the patient's history were reviewed and updated as appropriate: allergies, current medications, past family history, past medical history, past social history, past surgical history, and problem list.    Vitals:  Weight:  No Weight Documented This Encounter  Height: No Height Documented This Encounter  BMI:  There is no height or weight on file to calculate BMI.  Education:  The benefits of a healthy diet and exercise were discussed with patient, especially the positive effects they have on mental health. Patient encouraged to consider lifestyle modification regarding  diet and exercise patterns to maximize results of mental health treatment.    Medication Review:    Current Outpatient Medications:     ARIPiprazole (Abilify) 10 MG tablet, Take 1 tablet by mouth Daily., Disp: 30 tablet, Rfl: 1    lamoTRIgine (LaMICtal) 200 MG tablet, Take 1 tablet by mouth Daily., Disp: 30 tablet, Rfl: 1     Medication Compliance:  compliance with medication regimen;     Assessment & Plan      Trauma Assessment:  Patient denies having been hit, slapped, kicked or otherwise physically hurt by others since last visit as well as having been forced to engage in any unwanted sexual acts since last visit.       Risk Assessment:  Patient adamantly and  convincingly denies having SI/HI with or without intent, plans, or means. Patient denies having Hallucinations/Illusions and Delusions.  Patient  denies self-harming behaviors    Notes & Risk Factors:  - Risk factors: Overwhelmed and exhausted, stress, irritable mood, depression, PTSD, anxiety  - Protective factors: Proactive approach to self-care, supportive work environment, good sleep quality  Risk Level: History obtained from: patient and chart review.  Due to historical context and reported clinical markers, it appears patient meets criteria for LOW RISK to engage in self-harm or harm to others. Baseline risk is increased compared to general population due to significant psychiatric comorbidity. It is recommended Lattie be evaluated and assessed each contact for intent, plan, means and/or lethality each contact.      Behavior Health Review Of Systems:  Psychiatric/Behavioral: Negative for agitation, behavioral problems, decreased concentration, dysphoric mood, hallucinations, self-injury,  suicidal ideas, negative for hyperactivity. Positive for sleep disturbance,   and stress. The patient is nervous/anxious.    Pertinent items are noted in HPI.    MENTAL STATUS EXAM   General Appearance:  Cleanly groomed and dressed  Eye Contact:  Good eye contact  Attitude:  Cooperative  Motor Activity:  Other  Other Comment:  Virtual visit  Speech:  Normal rate, tone, volume  Language:  Spontaneous  Mood and affect:  Normal, pleasant  Hopelessness:  Denies  Loneliness: Denies  Thought Process:  Goal-directed, linear and logical  Associations/ Thought Content:  No delusions  Hallucinations:  None  Suicidal Ideations:  Not present  Homicidal Ideation:  Not present  Sensorium:  Alert  Orientation:  Person, place, time and situation  Immediate Recall, Recent, and Remote Memory:  Intact  Attention Span/ Concentration:  Good  Fund of Knowledge:  Appropriate for age and educational level  Insight:  Good  Judgement:   Good  Reliability:  Good  Impulse Control:  Good       Treatment plan status:  Active   Treatment plan progress: Progressing  Prognosis: Fair with Ongoing Treatment   Functional Status: Moderate impairment   Disposition:   Patient does not appear to be malingering.    Visit Diagnosis/Plan    ICD-10-CM ICD-9-CM   1. Bipolar II disorder  F31.81 296.89   2. Post traumatic stress disorder (PTSD)  F43.10 309.81     Assessment & Plan  Problems:  - Overwhelmed and exhausted  - Stress and irritable mood  - Bipolar Disorders noticing depression   - Post-traumatic stress disorder (PTSD)  - Anxiety    Content of Therapy:  During the session, the patient discussed her recent experiences at work, including the transition to a new role and the associated stress. She expressed feelings of being overwhelmed and exhausted, which have contributed to depressive symptoms. The patient shared her excitement about the upcoming break and her plans to use this time for self-care and rest. The conversation also covered her experiences with panic attacks in crowded environments, her use of marijuana for sleep, and her strategies for managing stress and anxiety. The patient was encouraged to identify activities that replenish her and incorporate them into her routine.    Clinical Impression:  The patient appears to be experiencing significant stress and exhaustion related to her work responsibilities, which have led to depressive symptoms and feelings of helplessness and worthlessness. Despite these challenges, she has shown resilience and a proactive approach to managing her mental health by requesting time off and planning self-care activities. Her mood has improved with the anticipation of the break, and she reports good sleep quality due to nightly marijuana use. The patient is motivated to make positive changes and is receptive to therapeutic interventions.    Therapeutic Intervention:  Assisted patient in processing above session  content; acknowledged and normalized patient’s thoughts, feelings, and concerns.     - Reframing thoughts about work and self-worth.  Exploring and processing what are triggers for mood instability and worsening of symptoms.   - Exploring feelings of stress and exhaustion  - Addressing conflict resolution and communication with colleagues  - Encouraging mindfulness activities such as breathing exercises, yoga, david chi, and swimming  - Promoting regular outdoor activities like walking to manage anxiety and depression    Clinical Maneuvering:  The benefits of a healthy diet and exercise were discussed with patient, especially the positive effects they have on mental health. Patient encouraged to consider lifestyle modification regarding  diet and exercise patterns to maximize results of mental health treatment.  Reviewed previous available documentation  Reviewed most recent available labs   Plan:  - Engage in mindfulness activities (breathing exercises, yoga, david chi, swimming)  - Incorporate regular outdoor activities (20-30 minute walks daily)  - Identify and engage in activities that replenish him  - Maintain a healthy sleep schedule and ensure adequate rest  - Consider a pedicure with reflexology for relaxation-mindfulness    Justification for therapy: Patient continues to struggle with a chronic/pervasive mental illness which continues to cause impairment in at least two important areas of functioning.  Patient appear(s) to maintain relative stability as compared to the  baseline measure.  Patient can reasonably be expected to continue to benefit from treatment and would likely be at increased risk for decompensation if treatment were stopped.  Patient endorses a positive benefit from therapy and appears to meet outpatient level of care. Patient expresses gratitude and reports Jens had a positive experience today.Recommended Referrals: Psychiatrist/APRN and Medical Provider (PCP)    Follow Up:  Return in about  1-2 weeks, or earlier if symptoms worsen or fail to improve for next follow up visit. [phone]  Goals for the next session: Evaluate the effectiveness of self-care activities and discuss any adjustments needed    Future Appointments         Provider Department Center    7/29/2025 12:00 PM Martina Boogie LCSW [facility]    8/6/2025 12:00 PM Martina Boogie LCSW [facility]    8/7/2025 2:00 PM (Arrive by 1:45 PM) Negrita Mccabe APRN [facility]     8/11/2025 4:00 PM Martina Boogie LCSW [facility]    8/18/2025 4:00 PM Martina Boogie LCSW [facility]    8/25/2025 4:00 PM Martina Boogie LCSW [facility]    9/8/2025 4:00 PM Martina Boogie LCSW [facility]            This document electronically signed by Martina Osei LCSW, [facility] July 21, 2025 16:35 EDT    Patient or patient representative verbalized consent for the use of Ambient Listening during the visit with  Martina Osei LCSW for chart documentation. 7/21/2025  16:02 EDT    At [facility], we believe that sharing information builds trust and better relationships. You are receiving this note because you are receiving care at [facility] or have recently visited. It is possible you will see health information before a provider has talked with you about it. This kind of information can be easy to misunderstand as it is a medical document. It is intended as [id number] communication. It is written in medical language and may contain abbreviations or verbiage that are unfamiliar. It may appear blunt or direct. Medical documents are intended to carry relevant information, facts as evident, and the  clinical opinion of the practitioner.  To help you fully understand what it means for your health, we urge you to discuss this note with your provider

## 2025-07-29 ENCOUNTER — TELEMEDICINE (OUTPATIENT)
Dept: PSYCHIATRY | Facility: CLINIC | Age: 25
End: 2025-07-29
Payer: COMMERCIAL

## 2025-07-29 DIAGNOSIS — F43.10 POST TRAUMATIC STRESS DISORDER (PTSD): ICD-10-CM

## 2025-07-29 DIAGNOSIS — F31.81 BIPOLAR II DISORDER: Primary | ICD-10-CM
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
## 2025-08-06 ENCOUNTER — TELEMEDICINE (OUTPATIENT)
Dept: PSYCHIATRY | Facility: CLINIC | Age: 25
End: 2025-08-06
Payer: COMMERCIAL

## 2025-08-11 ENCOUNTER — TELEMEDICINE (OUTPATIENT)
Dept: PSYCHIATRY | Facility: CLINIC | Age: 25
End: 2025-08-11
Payer: COMMERCIAL

## 2025-08-11 DIAGNOSIS — F31.81 BIPOLAR II DISORDER: Primary | ICD-10-CM

## 2025-08-11 DIAGNOSIS — F43.10 POST TRAUMATIC STRESS DISORDER (PTSD): ICD-10-CM

## 2025-08-11 PROCEDURE — 90834 PSYTX W PT 45 MINUTES: CPT | Performed by: SOCIAL WORKER

## 2025-08-18 ENCOUNTER — TELEMEDICINE (OUTPATIENT)
Dept: PSYCHIATRY | Facility: CLINIC | Age: 25
End: 2025-08-18
Payer: COMMERCIAL

## 2025-08-18 DIAGNOSIS — F43.10 POST TRAUMATIC STRESS DISORDER (PTSD): ICD-10-CM

## 2025-08-18 DIAGNOSIS — F31.81 BIPOLAR II DISORDER: Primary | ICD-10-CM

## 2025-08-25 ENCOUNTER — TELEMEDICINE (OUTPATIENT)
Dept: PSYCHIATRY | Facility: CLINIC | Age: 25
End: 2025-08-25
Payer: COMMERCIAL

## 2025-08-25 DIAGNOSIS — F31.81 BIPOLAR II DISORDER: Primary | ICD-10-CM

## 2025-08-25 DIAGNOSIS — Z62.819 HISTORY OF ABUSE IN CHILDHOOD: ICD-10-CM

## 2025-08-25 DIAGNOSIS — F43.10 POST TRAUMATIC STRESS DISORDER (PTSD): ICD-10-CM

## 2025-08-25 PROCEDURE — 90837 PSYTX W PT 60 MINUTES: CPT | Performed by: SOCIAL WORKER
